# Patient Record
Sex: FEMALE | Race: WHITE | NOT HISPANIC OR LATINO | Employment: OTHER | URBAN - METROPOLITAN AREA
[De-identification: names, ages, dates, MRNs, and addresses within clinical notes are randomized per-mention and may not be internally consistent; named-entity substitution may affect disease eponyms.]

---

## 2017-02-06 ENCOUNTER — TELEPHONE (OUTPATIENT)
Dept: INTERNAL MEDICINE | Facility: CLINIC | Age: 67
End: 2017-02-06

## 2017-02-06 DIAGNOSIS — E11.9 TYPE 2 DIABETES MELLITUS WITHOUT COMPLICATION, WITHOUT LONG-TERM CURRENT USE OF INSULIN (H): Primary | ICD-10-CM

## 2017-02-06 DIAGNOSIS — E78.5 HYPERLIPIDEMIA LDL GOAL <100: ICD-10-CM

## 2017-02-06 DIAGNOSIS — I10 HYPERTENSION GOAL BP (BLOOD PRESSURE) < 140/90: ICD-10-CM

## 2017-02-06 RX ORDER — ATENOLOL 50 MG/1
50 TABLET ORAL DAILY
Qty: 90 TABLET | Refills: 3 | Status: SHIPPED
Start: 2017-02-06 | End: 2017-07-31

## 2017-02-06 RX ORDER — HYDROCHLOROTHIAZIDE 25 MG/1
25 TABLET ORAL EVERY MORNING
Qty: 90 TABLET | Refills: 3 | Status: SHIPPED
Start: 2017-02-06 | End: 2019-05-09

## 2017-02-06 RX ORDER — GLIPIZIDE 5 MG/1
5 TABLET, FILM COATED, EXTENDED RELEASE ORAL DAILY
Qty: 90 TABLET | Refills: 3 | Status: SHIPPED
Start: 2017-02-06 | End: 2019-03-15

## 2017-02-06 RX ORDER — SIMVASTATIN 80 MG
80 TABLET ORAL AT BEDTIME
Qty: 90 TABLET | Refills: 3 | Status: SHIPPED
Start: 2017-02-06 | End: 2019-11-13

## 2017-02-06 NOTE — TELEPHONE ENCOUNTER
Reason for Call:  Medication or medication refill:    Do you use a [facility]?  Name of the pharmacy and phone number for the current request:  [facility]    Name of the medication requested: all of her meds are due    Other request: Pt is set for Wellness exam on 02/14/17 with Dr Sharma - she will be out of her meds before that visit.  Can she get enough to hold her over until her appt?    Can we leave a detailed message on this number? YES    Phone number patient can be reached at: Home number on file [phone] (home)    Best Time: any      Call taken on 2/6/2017 at 7:48 AM by Abbie Mccarthy

## 2017-02-14 ENCOUNTER — OFFICE VISIT (OUTPATIENT)
Dept: INTERNAL MEDICINE | Facility: CLINIC | Age: 67
End: 2017-02-14
Payer: COMMERCIAL

## 2017-02-14 ENCOUNTER — HOSPITAL ENCOUNTER (OUTPATIENT)
Dept: MAMMOGRAPHY | Facility: CLINIC | Age: 67
Discharge: HOME | End: 2017-02-14
Attending: INTERNAL MEDICINE | Admitting: INTERNAL MEDICINE
Payer: MEDICARE

## 2017-02-14 VITALS
BODY MASS INDEX: 28.88 KG/M2 | HEART RATE: 88 BPM | DIASTOLIC BLOOD PRESSURE: 88 MMHG | TEMPERATURE: 97.3 F | WEIGHT: 163 LBS | RESPIRATION RATE: 16 BRPM | SYSTOLIC BLOOD PRESSURE: 126 MMHG | HEIGHT: 63 IN | OXYGEN SATURATION: 95 %

## 2017-02-14 DIAGNOSIS — I10 HYPERTENSION GOAL BP (BLOOD PRESSURE) < 140/90: ICD-10-CM

## 2017-02-14 DIAGNOSIS — E78.5 HYPERLIPIDEMIA LDL GOAL <100: ICD-10-CM

## 2017-02-14 DIAGNOSIS — E11.9 TYPE 2 DIABETES MELLITUS WITHOUT COMPLICATION, WITHOUT LONG-TERM CURRENT USE OF INSULIN (H): ICD-10-CM

## 2017-02-14 DIAGNOSIS — Z12.31 VISIT FOR SCREENING MAMMOGRAM: ICD-10-CM

## 2017-02-14 DIAGNOSIS — Z23 NEED FOR PNEUMOCOCCAL VACCINATION: ICD-10-CM

## 2017-02-14 DIAGNOSIS — Z00.00 ENCOUNTER FOR ROUTINE ADULT HEALTH EXAMINATION WITHOUT ABNORMAL FINDINGS: Primary | ICD-10-CM

## 2017-02-14 LAB
ALBUMIN SERPL-MCNC: 4 G/DL (ref 3.4–5)
ALP SERPL-CCNC: 72 U/L (ref 40–150)
ALT SERPL W P-5'-P-CCNC: 25 U/L (ref 0–50)
ANION GAP SERPL CALCULATED.3IONS-SCNC: 3 MMOL/L (ref 3–14)
AST SERPL W P-5'-P-CCNC: 17 U/L (ref 0–45)
BILIRUB SERPL-MCNC: 0.6 MG/DL (ref 0.2–1.3)
BUN SERPL-MCNC: 19 MG/DL (ref 7–30)
CALCIUM SERPL-MCNC: 9.5 MG/DL (ref 8.5–10.1)
CHLORIDE SERPL-SCNC: 102 MMOL/L (ref 94–109)
CHOLEST SERPL-MCNC: 164 MG/DL
CO2 SERPL-SCNC: 34 MMOL/L (ref 20–32)
CREAT SERPL-MCNC: 0.74 MG/DL (ref 0.52–1.04)
CREAT UR-MCNC: 154 MG/DL
GFR SERPL CREATININE-BSD FRML MDRD: 78 ML/MIN/1.7M2
GLUCOSE SERPL-MCNC: 223 MG/DL (ref 70–99)
HBA1C MFR BLD: 7.8 % (ref 4.3–6)
HDLC SERPL-MCNC: 65 MG/DL
LDLC SERPL CALC-MCNC: 74 MG/DL
MICROALBUMIN UR-MCNC: 10 MG/L
MICROALBUMIN/CREAT UR: 6.43 MG/G CR (ref 0–25)
NONHDLC SERPL-MCNC: 99 MG/DL
POTASSIUM SERPL-SCNC: 3.9 MMOL/L (ref 3.4–5.3)
PROT SERPL-MCNC: 7.3 G/DL (ref 6.8–8.8)
SODIUM SERPL-SCNC: 139 MMOL/L (ref 133–144)
TRIGL SERPL-MCNC: 124 MG/DL
TSH SERPL DL<=0.05 MIU/L-ACNC: 0.7 MU/L (ref 0.4–4)

## 2017-02-14 PROCEDURE — 84443 ASSAY THYROID STIM HORMONE: CPT | Performed by: INTERNAL MEDICINE

## 2017-02-14 PROCEDURE — 90670 PCV13 VACCINE IM: CPT | Performed by: INTERNAL MEDICINE

## 2017-02-14 PROCEDURE — 80061 LIPID PANEL: CPT | Performed by: INTERNAL MEDICINE

## 2017-02-14 PROCEDURE — 82043 UR ALBUMIN QUANTITATIVE: CPT | Performed by: INTERNAL MEDICINE

## 2017-02-14 PROCEDURE — G0009 ADMIN PNEUMOCOCCAL VACCINE: HCPCS | Performed by: INTERNAL MEDICINE

## 2017-02-14 PROCEDURE — 83036 HEMOGLOBIN GLYCOSYLATED A1C: CPT | Performed by: INTERNAL MEDICINE

## 2017-02-14 PROCEDURE — G0202 SCR MAMMO BI INCL CAD: HCPCS

## 2017-02-14 PROCEDURE — 80053 COMPREHEN METABOLIC PANEL: CPT | Performed by: INTERNAL MEDICINE

## 2017-02-14 PROCEDURE — G0438 PPPS, INITIAL VISIT: HCPCS | Performed by: INTERNAL MEDICINE

## 2017-02-14 PROCEDURE — 36415 COLL VENOUS BLD VENIPUNCTURE: CPT | Performed by: INTERNAL MEDICINE

## 2017-02-14 ASSESSMENT — PAIN SCALES - GENERAL: PAINLEVEL: NO PAIN (0)

## 2017-02-14 NOTE — NURSING NOTE
"Chief Complaint   Patient presents with     Wellness Visit       Initial /88 (BP Location: Left arm, Patient Position: Chair, Cuff Size: Adult Regular)  Pulse 88  Temp 97.3  F (36.3  C) (Temporal)  Resp 16  Ht 5' 3\" (1.6 m)  Wt 163 lb (73.9 kg)  SpO2 95%  BMI 28.87 kg/m2 Estimated body mass index is 28.87 kg/(m^2) as calculated from the following:    Height as of this encounter: 5' 3\" (1.6 m).    Weight as of this encounter: 163 lb (73.9 kg).  Medication Reconciliation: complete   Precious Moore MA    "

## 2017-02-14 NOTE — PATIENT INSTRUCTIONS
Preventive Health Recommendations    Female Ages 65 +    Yearly exam:     See your health care provider every year in order to  o Review health changes.   o Discuss preventive care.    o Review your medicines if your doctor has prescribed any.      You no longer need a yearly Pap test unless you've had an abnormal Pap test in the past 10 years. If you have vaginal symptoms, such as bleeding or discharge, be sure to talk with your provider about a Pap test.      Every 1 to 2 years, have a mammogram.  If you are over 69, talk with your health care provider about whether or not you want to continue having screening mammograms.      Every 10 years, have a colonoscopy. Or, have a yearly FIT test (stool test). These exams will check for colon cancer.       Have a cholesterol test every 5 years, or more often if your doctor advises it.       Have a diabetes test (fasting glucose) every three years. If you are at risk for diabetes, you should have this test more often.       At age 65, have a bone density scan (DEXA) to check for osteoporosis (brittle bone disease).    Shots:    Get a flu shot each year.    Get a tetanus shot every 10 years.    Talk to your doctor about your pneumonia vaccines. There are now two you should receive - Pneumovax (PPSV 23) and Prevnar (PCV 13).    Talk to your doctor about the shingles vaccine.    Talk to your doctor about the hepatitis B vaccine.    Nutrition:     Eat at least 5 servings of fruits and vegetables each day.      Eat whole-grain bread, whole-wheat pasta and brown rice instead of white grains and rice.      Talk to your provider about Calcium and Vitamin D.     Lifestyle    Exercise at least 150 minutes a week (30 minutes a day, 5 days a week). This will help you control your weight and prevent disease.      Limit alcohol to one drink per day.      No smoking.       Wear sunscreen to prevent skin cancer.       See your dentist twice a year for an exam and cleaning.      See your  eye doctor every 1 to 2 years to screen for conditions such as glaucoma, macular degeneration and cataracts.  Service Indications Recommend Completed   Vaccines  Pneumococcal   Once for patients >65     Influenza Yearly, for all beneficiaries     Hepatitis B (if medium/high risk) Medium/high risk factors:  ESRD  Hemophiliacs who received Factor XIII or IX concentrates  Clients of institutions for mentally handicapped  Persons who live in same house as a HepB carrier  Homosexual men  Illicit injectable drug users  Health care workers  Staff of institutions for mentally handicapped     Mammogram One-time screen between age 35-39, annually for age >39     Pap and Pelvic Exam Annually if  high risk,  or childbearing age with abnormal Pap in last 3 years.  Q24 months for all other women     Prostate Cancer Screening  Digital rectal exam  PSA Annually for all men > age 50     Colorectal Cancer Screening      Diabetes self-management training Requires referral by treating physician for patient with diabetes     Diabetes screening tests  Fasting blood sugar or glucose tolerance test Patient must be diagnosed with one of the following:  Hypertension  Dyslipidemia  Obesity (BMI ? 30 kg/m )  Previous impaired fasting glucose or impaired glucose tolerance             . . . or any two of the following:  Overweight (BMI ? 25, < 30)  Family history of diabetes  Age 65 years or older  History of gestational diabetes, or birth to baby weighing > 9 lbs.     Cardiovascular screening blood tests  Total cholesterol  HDL  Triglycerides All asymptomatic beneficiaries every 5 years     Medical nutrition therapy for diabetes or renal disease Requires referral by treating physician for patient with diabetes or renal disease     Glaucoma screening Patient must have one of the following risk factors:  Diabetes mellitus  Family history of glaucoma  -American age 50 and over  -American age 65 and over     Bone Density  Measurement  (Dexa scan) Patient must have one of the following risk factors:  Determined by provider to be estrogen deficient  Vertebral abnormalities on x-ray indicative of osteoporosis, osteopenia, or vertebral fracture  Receiving, or expected to receive, equivalent of 5 mg of Prednisone, or greater, per day, for > 3 months.  Known hyperparathyroidism  Patient being monitored for response to osteoporosis therapy     One-time AAA screen  ** Must be ordered as part of Medicare IPPE Patient must have one of the following:  Family history of AAA  Age 65-75, with history of smoking at least 100 cigarettes in lifetime

## 2017-02-14 NOTE — PROGRESS NOTES
Screening Questionnaire for Adult Immunization    Are you sick today?   No   Do you have allergies to medications, food, a vaccine component or latex?   No   Have you ever had a serious reaction after receiving a vaccination?   No   Do you have a long-term health problem with heart disease, lung disease, asthma, kidney disease, metabolic disease (e.g. diabetes), anemia, or other blood disorder?   Yes   Do you have cancer, leukemia, HIV/AIDS, or any other immune system problem?   No   In the past 3 months, have you taken medications that affect  your immune system, such as prednisone, other steroids, or anticancer drugs; drugs for the treatment of rheumatoid arthritis, Crohn s disease, or psoriasis; or have you had radiation treatments?   No   Have you had a seizure, or a brain or other nervous system problem?   No   During the past year, have you received a transfusion of blood or blood     products, or been given immune (gamma) globulin or antiviral drug?   No   For women: Are you pregnant or is there a chance you could become        pregnant during the next month?   No   Have you received any vaccinations in the past 4 weeks?   No     Immunization questionnaire was positive for at least one answer.  Notified yes.      MNVFC doesn't apply on this patient    Per orders of Dr. Rene Sharma, injection of prevnar given by Precious Moore. Patient instructed to remain in clinic for 20 minutes afterwards, and to report any adverse reaction to me immediately.       Screening performed by Precious Moore on 2/14/2017 at 9:01 AM.

## 2017-02-14 NOTE — PROGRESS NOTES
UBJECTIVE:                                                            Rylie Young is a 66 year old female who presents for Preventive Visit.  Are you in the first 12 months of your Medicare Part B coverage?  No    Healthy Habits:    Do you get at least three servings of calcium containing foods daily (dairy, green leafy vegetables, etc.)? yes    Amount of exercise or daily activities, outside of work: not much    Problems taking medications regularly No    Medication side effects: No    Have you had an eye exam in the past two years? yes    Do you see a dentist twice per year? yes    Do you have sleep apnea, excessive snoring or daytime drowsiness?no    COGNITIVE SCREEN  1) Repeat 3 items (Banana, Sunrise, Chair)    2) Clock draw: NORMAL  3) 3 item recall: Recalls 2 objects   Results: NORMAL clock, 1-2 items recalled: COGNITIVE IMPAIRMENT LESS LIKELY    Mini-CogTM Copyright S Tomas. Licensed by the author for use in [facility] [employer]; reprinted with permission ([email or web address]@[email or web address]). All rights reserved.      Doing pretty well, checking sugars twice a day, morning is higher and around 170 range.  Later is 150 before supper.  Sometimes lower.            All Histories reviewed and updated in [facility] as appropriate.  Social History   Substance Use Topics     Smoking status: Never Smoker     Smokeless tobacco: Not on file     Alcohol use No       The patient does not drink >3 drinks per day nor >7 drinks per week.    Today's PHQ-2 Score:   PHQ-2 ( 1999 Pfizer) 2/14/2017 2/5/2016   Q1: Little interest or pleasure in doing things 0 0   Q2: Feeling down, depressed or hopeless 0 0   PHQ-2 Score 0 0       Do you feel safe in your environment - Yes    Do you have a Health Care Directive?: Yes: Patient states has Advance Directive and will bring in a copy to clinic.    Current providers sharing in care for this patient include:   Patient Care Team:  Rene Sharma MD as PCP - General (Internal Medicine)      Hearing  impairment: No    Ability to successfully perform activities of daily living: Yes, no assistance needed     Fall risk:  Fallen 2 or more times in the past year?: No  Any fall with injury in the past year?: No    Home safety:  none identified      The following health maintenance items are reviewed in Epic and correct as of today:  Health Maintenance   Topic Date Due     HEPATITIS C SCREENING  [birth year]02/1968     DEXA SCAN SCREENING (SYSTEM ASSIGNED)  03/02/2013     PNEUMOCOCCAL (1 of 2 - PCV13) 03/02/2013     FOOT EXAM Q1 YEAR( NO INBASKET)  02/06/2014     EYE EXAM Q1 YEAR( NO INBASKET)  04/24/2015     A1C Q6 MO( NO INBASKET)  08/05/2016     TSH W/ FREE T4 REFLEX Q2 YEAR (NO INBASKET)  02/04/2017     CREATININE Q1 YEAR (NO INBASKET)  02/05/2017     LIPID MONITORING Q1 YEAR( NO INBASKET)  02/05/2017     MICROALBUMIN Q1 YEAR( NO INBASKET)  02/05/2017     ADVANCE DIRECTIVE PLANNING Q5 YRS (NO INBASKET)  02/07/2017     MAMMO SCREEN Q2 YR (SYSTEM ASSIGNED)  02/04/2017     FALL RISK ASSESSMENT  02/05/2017     TETANUS IMMUNIZATION (SYSTEM ASSIGNED)  02/17/2022     COLON CANCER SCREEN (SYSTEM ASSIGNED)  04/09/2024     INFLUENZA VACCINE (SYSTEM ASSIGNED)  Completed         Pneumonia Vaccine:prevnar 13 today.     ROS:  C: NEGATIVE for fever, chills, change in weight  I: NEGATIVE for worrisome rashes, moles or lesions  E: NEGATIVE for vision changes or irritation  E/M: NEGATIVE for ear, mouth and throat problems  R: NEGATIVE for significant cough or SOB  B: NEGATIVE for masses, tenderness or discharge  CV: NEGATIVE for chest pain, palpitations or peripheral edema  GI: NEGATIVE for nausea, abdominal pain, heartburn, or change in bowel habits  : NEGATIVE for frequency, dysuria, or hematuria  M: NEGATIVE for significant arthralgias or myalgia  N: NEGATIVE for weakness, dizziness or paresthesias  E: NEGATIVE for temperature intolerance, skin/hair changes  H: NEGATIVE for bleeding problems  P: NEGATIVE for changes in mood or  "affect    Problem list, Medication list, Allergies, and Medical/Social/Surgical histories reviewed in [facility] and updated as appropriate.  OBJECTIVE:                                                            /88 (BP Location: Left arm, Patient Position: Chair, Cuff Size: Adult Regular)  Pulse 88  Temp 97.3  F (36.3  C) (Temporal)  Resp 16  Ht 5' 3\" (1.6 m)  Wt 163 lb (73.9 kg)  SpO2 95%  BMI 28.87 kg/m2 Estimated body mass index is 28.87 kg/(m^2) as calculated from the following:    Height as of this encounter: 5' 3\" (1.6 m).    Weight as of this encounter: 163 lb (73.9 kg).  EXAM:   GENERAL: healthy, alert and no distress  EYES: Eyes grossly normal to inspection, PERRL and conjunctivae and sclerae normal  HENT: ear canals and TM's normal, nose and mouth without ulcers or lesions  NECK: no adenopathy, no asymmetry, masses, or scars and thyroid normal to palpation  RESP: lungs clear to auscultation - no rales, rhonchi or wheezes  BREAST: patient deferred exam  CV: regular rate and rhythm, normal S1 S2, no S3 or S4, no murmur, click or rub, no peripheral edema and peripheral pulses strong  ABDOMEN: soft, nontender, no hepatosplenomegaly, no masses and bowel sounds normal  MS: no gross musculoskeletal defects noted, no edema  SKIN: no suspicious lesions or rashes  NEURO: Normal strength and tone, mentation intact and speech normal  PSYCH: mentation appears normal, affect normal/bright    ASSESSMENT / PLAN:                                                                ICD-10-CM    1. Visit for screening mammogram Z12.31 *MA Screening Digital Bilateral   2. Encounter for routine adult health examination without abnormal findings Z00.00 Hemoglobin A1c     Lipid Profile     Comprehensive metabolic panel     Albumin Random Urine Quantitative     TSH   3. Type 2 diabetes mellitus without complication, without long-term current use of insulin (H) E11.9 Hemoglobin A1c     Lipid Profile     Comprehensive metabolic panel    " " Albumin Random Urine Quantitative     TSH   4. Hypertension goal BP (blood pressure) < 140/90 I10 Hemoglobin A1c     Lipid Profile     Comprehensive metabolic panel     Albumin Random Urine Quantitative     TSH   5. Hyperlipidemia LDL goal <100 E78.5 Hemoglobin A1c     Lipid Profile     Comprehensive metabolic panel     Albumin Random Urine Quantitative     TSH     Overall doing well, stable medical conditions, no changes, check labs today.    End of Life Planning:  Patient currently has an advanced directive: Yes.  Practitioner is supportive of decision.    COUNSELING:  Reviewed preventive health counseling, as reflected in patient instructions       Regular exercise       Healthy diet/nutrition        Estimated body mass index is 28.87 kg/(m^2) as calculated from the following:    Height as of this encounter: 5' 3\" (1.6 m).    Weight as of this encounter: 163 lb (73.9 kg).     reports that she has never smoked. She does not have any smokeless tobacco history on file.      Appropriate preventive services were discussed with this patient, including applicable screening as appropriate for cardiovascular disease, diabetes, osteopenia/osteoporosis, and glaucoma.  As appropriate for age/gender, discussed screening for colorectal cancer, prostate cancer, breast cancer, and cervical cancer. Checklist reviewing preventive services available has been given to the patient.    Reviewed patients plan of care and provided an AVS. The Basic Care Plan (routine screening as documented in Health Maintenance) for Rylie meets the Care Plan requirement. This Care Plan has been established and reviewed with the Patient.    Counseling Resources:  ATP IV Guidelines  Pooled Cohorts Equation Calculator  Breast Cancer Risk Calculator  FRAX Risk Assessment  ICSI Preventive Guidelines  Dietary Guidelines for Americans, 2010  USDA's MyPlate  ASA Prophylaxis  Lung CA Screening    Rene Sharma MD  [facility]  "

## 2017-02-14 NOTE — MR AVS SNAPSHOT
After Visit Summary   2/14/2017    Rylie Young    MRN: [id number]           Patient Information     Date Of Birth          1950        Visit Information        Provider Department      2/14/2017 7:45 AM Rene Sharma MD [facility] Instructions      Preventive Health Recommendations    Female Ages 65 +    Yearly exam:     See your health care provider every year in order to  o Review health changes.   o Discuss preventive care.    o Review your medicines if your doctor has prescribed any.      You no longer need a yearly Pap test unless you've had an abnormal Pap test in the past 10 years. If you have vaginal symptoms, such as bleeding or discharge, be sure to talk with your provider about a Pap test.      Every 1 to 2 years, have a mammogram.  If you are over 69, talk with your health care provider about whether or not you want to continue having screening mammograms.      Every 10 years, have a colonoscopy. Or, have a yearly FIT test (stool test). These exams will check for colon cancer.       Have a cholesterol test every 5 years, or more often if your doctor advises it.       Have a diabetes test (fasting glucose) every three years. If you are at risk for diabetes, you should have this test more often.       At age 65, have a bone density scan (DEXA) to check for osteoporosis (brittle bone disease).    Shots:    Get a flu shot each year.    Get a tetanus shot every 10 years.    Talk to your doctor about your pneumonia vaccines. There are now two you should receive - Pneumovax (PPSV 23) and Prevnar (PCV 13).    Talk to your doctor about the shingles vaccine.    Talk to your doctor about the hepatitis B vaccine.    Nutrition:     Eat at least 5 servings of fruits and vegetables each day.      Eat whole-grain bread, whole-wheat pasta and brown rice instead of white grains and rice.      Talk to your provider about Calcium and Vitamin D.     Lifestyle    Exercise  at least 150 minutes a week (30 minutes a day, 5 days a week). This will help you control your weight and prevent disease.      Limit alcohol to one drink per day.      No smoking.       Wear sunscreen to prevent skin cancer.       See your dentist twice a year for an exam and cleaning.      See your eye doctor every 1 to 2 years to screen for conditions such as glaucoma, macular degeneration and cataracts.  Service Indications Recommend Completed   Vaccines  Pneumococcal   Once for patients >65     Influenza Yearly, for all beneficiaries     Hepatitis B (if medium/high risk) Medium/high risk factors:  ESRD  Hemophiliacs who received Factor XIII or IX concentrates  Clients of institutions for mentally handicapped  Persons who live in same house as a HepB carrier  Homosexual men  Illicit injectable drug users  Health care workers  Staff of institutions for mentally handicapped     Mammogram One-time screen between age 35-39, annually for age >39     Pap and Pelvic Exam Annually if  high risk,  or childbearing age with abnormal Pap in last 3 years.  Q24 months for all other women     Prostate Cancer Screening  Digital rectal exam  PSA Annually for all men > age 50     Colorectal Cancer Screening      Diabetes self-management training Requires referral by treating physician for patient with diabetes     Diabetes screening tests  Fasting blood sugar or glucose tolerance test Patient must be diagnosed with one of the following:  Hypertension  Dyslipidemia  Obesity (BMI ? 30 kg/m )  Previous impaired fasting glucose or impaired glucose tolerance             . . . or any two of the following:  Overweight (BMI ? 25, < 30)  Family history of diabetes  Age 65 years or older  History of gestational diabetes, or birth to baby weighing > 9 lbs.     Cardiovascular screening blood tests  Total cholesterol  HDL  Triglycerides All asymptomatic beneficiaries every 5 years     Medical nutrition therapy for diabetes or renal disease  "Requires referral by treating physician for patient with diabetes or renal disease     Glaucoma screening Patient must have one of the following risk factors:  Diabetes mellitus  Family history of glaucoma  -American age 50 and over  -American age 65 and over     Bone Density Measurement  (Dexa scan) Patient must have one of the following risk factors:  Determined by provider to be estrogen deficient  Vertebral abnormalities on x-ray indicative of osteoporosis, osteopenia, or vertebral fracture  Receiving, or expected to receive, equivalent of 5 mg of Prednisone, or greater, per day, for > 3 months.  Known hyperparathyroidism  Patient being monitored for response to osteoporosis therapy     One-time AAA screen  ** Must be ordered as part of Medicare IPPE Patient must have one of the following:  Family history of AAA  Age 65-75, with history of smoking at least 100 cigarettes in lifetime                 Follow-ups after your visit        Who to contact     If you have questions or need follow up information about today's clinic visit or your schedule please contact [facility] directly at [phone].  Normal or non-critical lab and imaging results will be communicated to you by Art[employer], letter or phone within 4 business days after the clinic has received the results. If you do not hear from us within 7 days, please contact the clinic through [employer] or phone. If you have a critical or abnormal lab result, we will notify you by phone as soon as possible.  Submit refill requests through [employer] or call your pharmacy and they will forward the refill request to us. Please allow 3 business days for your refill to be completed.          Additional Information About Your Visit        [employer] Information     [employer] lets you send messages to your doctor, view your test results, renew your prescriptions, schedule appointments and more. To sign up, go to [email or web address] . Click on \"Log " "in\" on the left side of the screen, which will take you to the Welcome page. Then click on \"Sign up Now\" on the right side of the page.     You will be asked to enter the access code listed below, as well as some personal information. Please follow the directions to create your username and password.     Your access code is: [email or web address]  Expires: 5/15/2017  8:06 AM     Your access code will  in 90 days. If you need help or a new code, please call your [facility] clinic or [phone].        Care EveryWhere ID     This is your Care EveryWhere ID. This could be used by other organizations to access your [facility] medical records  [id number]        Your Vitals Were     Pulse Temperature Respirations Height Pulse Oximetry BMI (Body Mass Index)    88 97.3  F (36.3  C) (Temporal) 16 5' 3\" (1.6 m) 95% 28.87 kg/m2       Blood Pressure from Last 3 Encounters:   17 126/88   16 132/86   02/04/15 128/84    Weight from Last 3 Encounters:   17 163 lb (73.9 kg)   16 169 lb (76.7 kg)   02/04/15 164 lb (74.4 kg)              Today, you had the following     No orders found for display       Primary Care Provider Office Phone # Fax #    Rene Sharma [phone]       [facility] [address] [place] [address]        Thank you!     Thank you for choosing [facility]  for your care. Our goal is always to provide you with excellent care. Hearing back from our patients is one way we can continue to improve our services. Please take a few minutes to complete the written survey that you may receive in the mail after your visit with us. Thank you!             Your Updated Medication List - Protect others around you: Learn how to safely use, store and throw away your medicines at www.disposemymeds.org.          This list is accurate as of: 17  8:06 AM.  Always use your most recent med list.                   Brand Name Dispense Instructions for use    " ACCU-CHEK CHUCHO PLUS test strip   Generic drug:  blood glucose monitoring     100 each    USE AS DIRECTED TO TEST BLOOD SUGARS TWO TIMES A DAY OR AS DIRECTED       aspirin 81 MG tablet     30 tablet    Take  by mouth daily.       atenolol 50 MG tablet    TENORMIN    90 tablet    Take 1 tablet (50 mg) by mouth daily       blood glucose calibration solution     1    every 3 month       blood glucose monitoring lancets     2 Box    Use to test blood sugar two times daily or as directed.       blood glucose monitoring meter device kit     1 kit    Use to test blood sugars two times daily or as directed.       CALTRATE 600 + D 600-200 MG-IU Tabs     30    1 TABLET TWICE  DAILY       EXCEDRIN EXTRA STRENGTH 250-250-65 MG per tablet   Generic drug:  aspirin-acetaminophen-caffeine      Take 1 tablet by mouth as needed.       glipiZIDE 5 MG 24 hr tablet    glipiZIDE XL    90 tablet    Take 1 tablet (5 mg) by mouth daily       hydrochlorothiazide 25 MG tablet    HYDRODIURIL    90 tablet    Take 1 tablet (25 mg) by mouth every morning       metFORMIN 500 MG tablet    GLUCOPHAGE    360 tablet    Take 2 tablets (1,000 mg) by mouth 2 times daily (with meals)       multivitamin per tablet     100    ONE DAILY-pt reported       simvastatin 80 MG tablet    ZOCOR    90 tablet    Take 1 tablet (80 mg) by mouth At Bedtime

## 2017-02-21 ENCOUNTER — TELEPHONE (OUTPATIENT)
Dept: INTERNAL MEDICINE | Facility: CLINIC | Age: 67
End: 2017-02-21

## 2017-02-21 DIAGNOSIS — E11.9 TYPE 2 DIABETES MELLITUS WITHOUT COMPLICATION, WITHOUT LONG-TERM CURRENT USE OF INSULIN (H): Primary | ICD-10-CM

## 2017-02-21 NOTE — LETTER
[address]   [address]  Tel. [phone]/ Fax [phone]    October 9, 2017        Rylie Young  [address] [address] [facility] [address]          Dear Ms. Goldgia DIAS Hannah:    Your previous orders for lab work(HgbA1c) have been extended.  Please call to schedule a lab appointment and return to the clinic to have the labs drawn at your earliest convenience.    If you are required to be fasting for these tests,  remember to have nothing by mouth (except water) after midnight on the night before your test.    If you have any questions or concerns, please contact our office.    Sincerely,       Rene Sharma MD

## 2017-02-21 NOTE — TELEPHONE ENCOUNTER
Reason for Call:  Other     Detailed comments: Patient called to get her lab results. Results were given and she would like to have the results mailed to her home if that hasn't been done already.    Phone Number Patient can be reached at: Home number on file [phone] (home)    Best Time: any    Can we leave a detailed message on this number? YES    Call taken on 2/21/2017 at 4:14 PM by Crystal Beebe

## 2017-02-21 NOTE — LETTER
[address]   [address]  Tel. [phone]/ Fax [phone]    November 28, 2017        Rylie Young  [address] [address] [facility] [address]          Dear Ms. Goldgia DIAS Hannah:    Your previous orders for lab work(HgbA1c) have been extended.  Please call to schedule a lab appointment and return to the clinic to have the labs drawn at your earliest convenience.    If you are required to be fasting for these tests,  remember to have nothing by mouth (except water) after midnight on the night before your test.    If you have any questions or concerns, please contact our office.    Sincerely,       Rene Sharma MD

## 2017-02-24 DIAGNOSIS — E11.8 TYPE 2 DIABETES MELLITUS WITH COMPLICATION, UNSPECIFIED LONG TERM INSULIN USE STATUS: ICD-10-CM

## 2017-02-24 RX ORDER — BLOOD-GLUCOSE METER
EACH MISCELLANEOUS
Qty: 1 KIT | Refills: 0 | Status: SHIPPED | OUTPATIENT
Start: 2017-02-24

## 2017-02-24 NOTE — TELEPHONE ENCOUNTER
[facility] phone call message- patient requests medication or medication refill:    If this is a refill request, has the caller requested the refill from the pharmacy already? No  Name of the pharmacy and phone number for the current request:  [facility] [phone]    Name of the medication requested: new blood glucose monitor     Other request: Her current one gives her 2 very different readings on different fingers.     OK to leave the result message on voice mail or with a family member? YES    Call taken on 2/24/2017 at 8:12 AM by Saima Vásquez

## 2017-02-24 NOTE — TELEPHONE ENCOUNTER
Prescription approved per [facility] Refill Protocol.  Eunice Moore RN

## 2017-07-07 DIAGNOSIS — E11.9 TYPE 2 DIABETES MELLITUS WITHOUT COMPLICATION, WITHOUT LONG-TERM CURRENT USE OF INSULIN (H): Primary | ICD-10-CM

## 2017-07-07 RX ORDER — BLOOD SUGAR DIAGNOSTIC
STRIP MISCELLANEOUS
Qty: 100 EACH | Refills: 5 | Status: SHIPPED | OUTPATIENT
Start: 2017-07-07 | End: 2019-08-16

## 2017-07-31 ENCOUNTER — TELEPHONE (OUTPATIENT)
Dept: INTERNAL MEDICINE | Facility: CLINIC | Age: 67
End: 2017-07-31

## 2017-07-31 DIAGNOSIS — E78.5 HYPERLIPIDEMIA LDL GOAL <100: ICD-10-CM

## 2017-07-31 DIAGNOSIS — E11.9 TYPE 2 DIABETES MELLITUS WITHOUT COMPLICATION, WITHOUT LONG-TERM CURRENT USE OF INSULIN (H): ICD-10-CM

## 2017-07-31 DIAGNOSIS — I10 HYPERTENSION GOAL BP (BLOOD PRESSURE) < 140/90: ICD-10-CM

## 2017-07-31 RX ORDER — ATENOLOL 50 MG/1
50 TABLET ORAL DAILY
Qty: 90 TABLET | Refills: 3 | Status: SHIPPED | OUTPATIENT
Start: 2017-07-31 | End: 2019-11-13

## 2017-07-31 NOTE — TELEPHONE ENCOUNTER
Reason for Call:  Medication or medication refill:    Do you use a [facility]?  Name of the pharmacy and phone number for the current request:  [facility] - [phone]    Name of the medication requested: atenolol    Other request: pt stated atenolol is on backorder with [employer].    Can we leave a detailed message on this number? YES    Phone number patient can be reached at:     Best Time:     Call taken on 7/31/2017 at 9:18 AM by Lina Mendez

## 2019-02-13 ENCOUNTER — HOSPITAL ENCOUNTER (OUTPATIENT)
Dept: CT IMAGING | Facility: CLINIC | Age: 69
Discharge: HOME | End: 2019-02-13
Attending: INTERNAL MEDICINE | Admitting: INTERNAL MEDICINE
Payer: MEDICARE

## 2019-02-13 ENCOUNTER — TELEPHONE (OUTPATIENT)
Dept: INTERNAL MEDICINE | Facility: CLINIC | Age: 69
End: 2019-02-13

## 2019-02-13 ENCOUNTER — OFFICE VISIT (OUTPATIENT)
Dept: INTERNAL MEDICINE | Facility: CLINIC | Age: 69
End: 2019-02-13
Payer: MEDICARE

## 2019-02-13 VITALS
RESPIRATION RATE: 16 BRPM | BODY MASS INDEX: 28.17 KG/M2 | WEIGHT: 159 LBS | DIASTOLIC BLOOD PRESSURE: 84 MMHG | SYSTOLIC BLOOD PRESSURE: 148 MMHG | HEART RATE: 88 BPM | OXYGEN SATURATION: 97 % | HEIGHT: 63 IN | TEMPERATURE: 97.1 F

## 2019-02-13 DIAGNOSIS — R10.84 ABDOMINAL PAIN, GENERALIZED: ICD-10-CM

## 2019-02-13 DIAGNOSIS — R19.7 DIARRHEA, UNSPECIFIED TYPE: ICD-10-CM

## 2019-02-13 DIAGNOSIS — R19.7 DIARRHEA, UNSPECIFIED TYPE: Primary | ICD-10-CM

## 2019-02-13 DIAGNOSIS — K57.32 DIVERTICULITIS OF COLON: Primary | ICD-10-CM

## 2019-02-13 PROCEDURE — 74177 CT ABD & PELVIS W/CONTRAST: CPT

## 2019-02-13 PROCEDURE — 99214 OFFICE O/P EST MOD 30 MIN: CPT | Performed by: INTERNAL MEDICINE

## 2019-02-13 PROCEDURE — 25000128 H RX IP 250 OP 636: Performed by: RADIOLOGY

## 2019-02-13 PROCEDURE — 25000125 ZZHC RX 250: Performed by: RADIOLOGY

## 2019-02-13 RX ORDER — METRONIDAZOLE 500 MG/1
500 TABLET ORAL 3 TIMES DAILY
Qty: 30 TABLET | Refills: 0 | Status: SHIPPED | OUTPATIENT
Start: 2019-02-13 | End: 2019-03-15

## 2019-02-13 RX ORDER — CIPROFLOXACIN 500 MG/1
500 TABLET, FILM COATED ORAL 2 TIMES DAILY
Qty: 20 TABLET | Refills: 0 | Status: SHIPPED | OUTPATIENT
Start: 2019-02-13 | End: 2019-03-15

## 2019-02-13 RX ORDER — IOPAMIDOL 755 MG/ML
200 INJECTION, SOLUTION INTRAVASCULAR ONCE
Status: COMPLETED | OUTPATIENT
Start: 2019-02-13 | End: 2019-02-13

## 2019-02-13 RX ADMIN — SODIUM CHLORIDE 100 ML: 9 INJECTION, SOLUTION INTRAVENOUS at 11:27

## 2019-02-13 RX ADMIN — IOPAMIDOL 80 ML: 755 INJECTION, SOLUTION INTRAVENOUS at 11:27

## 2019-02-13 ASSESSMENT — PAIN SCALES - GENERAL: PAINLEVEL: NO PAIN (0)

## 2019-02-13 ASSESSMENT — MIFFLIN-ST. JEOR: SCORE: 1210.35

## 2019-02-13 NOTE — TELEPHONE ENCOUNTER
Pt informed of results, treatment and to follow up in one month.  Appt has been scheduled.  Rx's pended.

## 2019-02-13 NOTE — TELEPHONE ENCOUNTER
----- Message from Rene Sharma MD sent at 2/13/2019  4:03 PM CST -----  Her ct scan shows diverticulitis of the colon, a bacterial infection. She should take antibiotics of cipro 500mg bid for 10days, flagyl 500mg tid for 10 days-no alcohol use while on it.      See me back in 4 weeks, will need future colonoscopy.

## 2019-02-13 NOTE — PROGRESS NOTES
SUBJECTIVE:   Rylie Young is a 68 year old female who presents to clinic today for the following health issues:    Chief Complaint   Patient presents with     Diarrhea     diarrhea since 1/23/19, having abdominal pain and cramping as well     She had moved up to [facility] for a while, now back to [place].      Diarrhea started on January 23, only reason is that they doubled her glipizide, took for 4 days then went back to once a day.  Taking metformin 2 pills in AM and 2 pills before supper.  500mg immediate release.    Sugars have been higher now. 150-200 range.  a1c was 8.5     No antibiotics, no new supplements.  No sick contacts.      No bloody stools, cramping, some lower back pains.      Past Medical History:   Diagnosis Date     Diabetic eye exam (H) 04/24/2014    [facility]     Edema      Essential hypertension, benign      Mixed hyperlipidemia      Other forms of migraine, with intractable migraine, so stated, without mention of status migrainosus     resolved after menopause     Type II or unspecified type diabetes mellitus without mention of complication, not stated as uncontrolled      Current Outpatient Medications   Medication     aspirin 81 MG tablet     aspirin-acetaminophen-caffeine (EXCEDRIN EXTRA STRENGTH) 250-250-65 MG per tablet     atenolol (TENORMIN) 50 MG tablet     CALTRATE 600 + D 600-200 MG-IU OR TABS     glipiZIDE (GLIPIZIDE XL) 5 MG 24 hr tablet     hydrochlorothiazide (HYDRODIURIL) 25 MG tablet     metFORMIN (GLUCOPHAGE) 500 MG tablet     MULTIVITAMINS OR TABS     simvastatin (ZOCOR) 80 MG tablet     ACCU-CHEK CHUCHO PLUS test strip     ACCU-CHEK CHUCHO VI SOLN     blood glucose monitoring (ACCU-CHEK CHUCHO PLUS) meter device kit     blood glucose monitoring (SOFTCLIX) lancets     No current facility-administered medications for this visit.      Social History     Tobacco Use     Smoking status: Never Smoker     Smokeless tobacco: Never Used   Substance  "Use Topics     Alcohol use: No     Drug use: No     Review of Systems  Constitutional-No fevers, chills, or weight changes..  Cardiac-No chest pain or palpitations.  Respiratory-No cough, sob, or hemoptysis.  GI-Diarrhea.    Physical Exam  /84 (BP Location: Right arm, Patient Position: Sitting, Cuff Size: Adult Large)   Pulse 88   Temp 97.1  F (36.2  C) (Temporal)   Resp 16   Ht 1.6 m (5' 3\")   Wt 72.1 kg (159 lb)   SpO2 97%   BMI 28.17 kg/m    General Appearance-healthy, alert, no distress  Cardiac-regular rate and rhythm  with normal S1, S2 ; no murmur, rub or gallops  Lungs-clear to auscultation  GI-lower abdomen with tenderness to palpation    ASSESSMENT:  This is a 68-year-old woman who comes in after not being here for a couple years, she was living up in [place] and has moved back twice and he says and had a physical in [facility] but was not happy there.  Now she comes in with an acute concern of diarrhea.  She has had this for 3 weeks, denies any antibiotics.  Denies any change in her metformin.  The one change was that her glipizide went from 10 mg a day to 10 mg twice a day however she went back to just once a day dosing of the glipizide and she still has diarrhea, liquid stool.  No exposures.  She does have some mild tenderness on her lower abdomen I worry about some colitis, or diverticulitis it does seem to give her some low back pain.  We will workup with an abdominal CT today we will see him to get stool cultures for enteric pathogens and C. difficile.    Her diabetes will need better control last A1c was 8.5 and I see any, she can follow-up to see me with this and will also need her blood pressure treated she is having a lot of stress right now and hopefully that will improve and allow other conditions to improve.    Electronically signed by Rene Sharma MD          "

## 2019-02-14 DIAGNOSIS — R19.7 DIARRHEA, UNSPECIFIED TYPE: ICD-10-CM

## 2019-02-14 LAB
C COLI+JEJUNI+LARI FUSA STL QL NAA+PROBE: NOT DETECTED
C DIFF TOX B STL QL: NEGATIVE
EC STX1 GENE STL QL NAA+PROBE: NOT DETECTED
EC STX2 GENE STL QL NAA+PROBE: NOT DETECTED
ENTERIC PATHOGEN COMMENT: NORMAL
NOROV GI+II ORF1-ORF2 JNC STL QL NAA+PR: NOT DETECTED
RVA NSP5 STL QL NAA+PROBE: NOT DETECTED
SALMONELLA SP RPOD STL QL NAA+PROBE: NOT DETECTED
SHIGELLA SP+EIEC IPAH STL QL NAA+PROBE: NOT DETECTED
SPECIMEN SOURCE: NORMAL
V CHOL+PARA RFBL+TRKH+TNAA STL QL NAA+PR: NOT DETECTED
Y ENTERO RECN STL QL NAA+PROBE: NOT DETECTED

## 2019-02-14 PROCEDURE — 87506 IADNA-DNA/RNA PROBE TQ 6-11: CPT | Performed by: INTERNAL MEDICINE

## 2019-02-14 PROCEDURE — 87493 C DIFF AMPLIFIED PROBE: CPT | Mod: 59 | Performed by: INTERNAL MEDICINE

## 2019-03-15 ENCOUNTER — OFFICE VISIT (OUTPATIENT)
Dept: INTERNAL MEDICINE | Facility: CLINIC | Age: 69
End: 2019-03-15
Payer: MEDICARE

## 2019-03-15 VITALS
SYSTOLIC BLOOD PRESSURE: 154 MMHG | RESPIRATION RATE: 16 BRPM | HEART RATE: 72 BPM | BODY MASS INDEX: 28.17 KG/M2 | DIASTOLIC BLOOD PRESSURE: 84 MMHG | OXYGEN SATURATION: 97 % | WEIGHT: 159 LBS | TEMPERATURE: 97.5 F

## 2019-03-15 DIAGNOSIS — E78.5 HYPERLIPIDEMIA LDL GOAL <100: ICD-10-CM

## 2019-03-15 DIAGNOSIS — I10 HYPERTENSION GOAL BP (BLOOD PRESSURE) < 140/90: ICD-10-CM

## 2019-03-15 DIAGNOSIS — E11.9 TYPE 2 DIABETES MELLITUS WITHOUT COMPLICATION, WITHOUT LONG-TERM CURRENT USE OF INSULIN (H): ICD-10-CM

## 2019-03-15 DIAGNOSIS — K57.92 DIVERTICULITIS: Primary | ICD-10-CM

## 2019-03-15 PROCEDURE — 99214 OFFICE O/P EST MOD 30 MIN: CPT | Performed by: INTERNAL MEDICINE

## 2019-03-15 RX ORDER — GLIPIZIDE 5 MG/1
5 TABLET, FILM COATED, EXTENDED RELEASE ORAL 2 TIMES DAILY
Qty: 90 TABLET | Refills: 3 | COMMUNITY
Start: 2019-03-15 | End: 2019-11-13 | Stop reason: DRUGHIGH

## 2019-03-15 ASSESSMENT — PAIN SCALES - GENERAL: PAINLEVEL: NO PAIN (0)

## 2019-03-15 NOTE — PROGRESS NOTES
SUBJECTIVE:   Rylie Young is a 69 year old female who presents to clinic today for the following health issues:    Chief Complaint   Patient presents with     RECHECK     follow up on diverticulitis, finished antibiotics but still having issues     CT scan showed diverticulitis 4 weeks ago, took her medications.      She still has loose stools after eating.   If she watches out for heavy food, does better, getting back to normal. One heavy meal like a fish atkinson and goes through her quickly.  Weight is the same as 4 weeks.      Colonoscopy was done in 2014 , needs a new one with diverticulitis.     Sugars are still high, weight isn't going down.    Past Medical History:   Diagnosis Date     Diabetic eye exam (H) 04/24/2014    [facility]     Edema      Essential hypertension, benign      Mixed hyperlipidemia      Other forms of migraine, with intractable migraine, so stated, without mention of status migrainosus     resolved after menopause     Type II or unspecified type diabetes mellitus without mention of complication, not stated as uncontrolled      Current Outpatient Medications   Medication     aspirin 81 MG tablet     aspirin-acetaminophen-caffeine (EXCEDRIN EXTRA STRENGTH) 250-250-65 MG per tablet     atenolol (TENORMIN) 50 MG tablet     CALTRATE 600 + D 600-200 MG-IU OR TABS     glipiZIDE (GLIPIZIDE XL) 5 MG 24 hr tablet     hydrochlorothiazide (HYDRODIURIL) 25 MG tablet     metFORMIN (GLUCOPHAGE) 500 MG tablet     MULTIVITAMINS OR TABS     simvastatin (ZOCOR) 80 MG tablet     ACCU-CHEK CHUCHO PLUS test strip     ACCU-CHEK CHUCHO VI SOLN     blood glucose monitoring (ACCU-CHEK CHUCHO PLUS) meter device kit     blood glucose monitoring (SOFTCLIX) lancets     No current facility-administered medications for this visit.      Social History     Tobacco Use     Smoking status: Never Smoker     Smokeless tobacco: Never Used   Substance Use Topics     Alcohol use: No     Drug use: No      Review of Systems  Constitutional-No fevers, chills, or weight changes..  Cardiac-No chest pain or palpitations.  Respiratory-No cough, sob, or hemoptysis.  GI-Diarrhea.    Physical Exam  /84 (BP Location: Right arm, Cuff Size: Adult Regular)   Pulse 72   Temp 97.5  F (36.4  C) (Temporal)   Resp 16   Wt 72.1 kg (159 lb)   SpO2 97%   BMI 28.17 kg/m     General Appearance-healthy, alert, no distress  Cardiac-regular rate and rhythm  with normal S1, S2 ; no murmur, rub or gallops  Lungs-clear to auscultation  GI-Soft, nontender.  Normal bowel sounds.  No hepatosplenomegaly or abnormal masses  Extremities-no peripheral edema, peripheral pulses normal      ASSESSMENT:  This is a 69-year-old woman who has a history disease, hypertension hyperlipidemia.  She came to see me a month ago.  She was having abdominal pain, diarrhea.  The pain is in the left lower quadrant we did a CAT scan showing diverticulitis.  We discussed her diabetes meds and possibility interact with diarrhea as well.  With the diverticulitis she got Cipro and Flagyl and she is improved she has no pain on examination but she still has some loose stools.  I think this is related to her metformin we will cut the metformin back though 500 mg twice a day continue her glipizide at 5 mg twice a day.  If her sugars stay high she may need some other medication like Jardiance.    Her blood pressure is elevated at 154/84 she thinks is just when she is here and she gets white coat hypertension.  She will continue her medications work on exercise and activity and recheck her blood pressure on her own.  Will follow up with her for complete labs, blood pressure check and diabetes check in 3 months.      Electronically signed by Rene Sharma MD

## 2019-03-18 ENCOUNTER — TELEPHONE (OUTPATIENT)
Dept: INTERNAL MEDICINE | Facility: CLINIC | Age: 69
End: 2019-03-18

## 2019-03-18 NOTE — TELEPHONE ENCOUNTER
Date of colonoscopy/EGD: 4/3/19  Surgeon: Dr. Parsons  Prep:Miralax  Packet:Colonoscopy/EGD instructions mailed to patient's home address.   Date: 3/18/2019      Surgery Scheduler

## 2019-03-18 NOTE — LETTER

## 2019-03-18 NOTE — LETTER
[address] [address]  [phone]        March 18, 2019    Rylie Young  [address] [place] [address] [address] [place] [id number]

## 2019-03-30 ENCOUNTER — NURSE TRIAGE (OUTPATIENT)
Dept: NURSING | Facility: CLINIC | Age: 69
End: 2019-03-30

## 2019-03-30 NOTE — TELEPHONE ENCOUNTER
"\"I'm a diabetic and I'm scheduled to have a colonoscopy of Wednesday\".  Caller said her paper work said to call PCP to discuss diet and medications.    Caller is non insulin dependent Type 2 DM on two oral agents. Her blood sugars run between 165-273.    [facility] at [facility].  Dr. Verduzco is on call, page sent @ 10:16 am via smart web.    Dr. Verduzco advised to take medication through Tuesday.  If her blood sugars start to bottom out during prep work have clear liquid with sugar like 7 up hand to drink to brink sugars back up.    Don't take medications on Wednesday prior to procedure, resume medications after procedure.    No special diet changes other than the pre colonoscopy low fiber and clear liquid diet guidelines.    Called Rylie with provider's advice, she appears to understand directives and agrees with plan.    Marily Cannon RN  [facility]      "

## 2019-04-03 ENCOUNTER — ANESTHESIA EVENT (OUTPATIENT)
Dept: GASTROENTEROLOGY | Facility: CLINIC | Age: 69
End: 2019-04-03
Payer: MEDICARE

## 2019-04-03 ENCOUNTER — HOSPITAL ENCOUNTER (OUTPATIENT)
Facility: CLINIC | Age: 69
Discharge: HOME | End: 2019-04-03
Attending: SURGERY | Admitting: SURGERY
Payer: MEDICARE

## 2019-04-03 ENCOUNTER — ANESTHESIA (OUTPATIENT)
Dept: GASTROENTEROLOGY | Facility: CLINIC | Age: 69
End: 2019-04-03
Payer: MEDICARE

## 2019-04-03 VITALS
HEART RATE: 79 BPM | DIASTOLIC BLOOD PRESSURE: 98 MMHG | SYSTOLIC BLOOD PRESSURE: 162 MMHG | OXYGEN SATURATION: 97 % | RESPIRATION RATE: 16 BRPM | TEMPERATURE: 97.8 F

## 2019-04-03 LAB
COLONOSCOPY: NORMAL
GLUCOSE BLDC GLUCOMTR-MCNC: 195 MG/DL (ref 70–99)

## 2019-04-03 PROCEDURE — 37000008 ZZH ANESTHESIA TECHNICAL FEE, 1ST 30 MIN: Performed by: SURGERY

## 2019-04-03 PROCEDURE — 25000125 ZZHC RX 250: Performed by: NURSE ANESTHETIST, CERTIFIED REGISTERED

## 2019-04-03 PROCEDURE — 45378 DIAGNOSTIC COLONOSCOPY: CPT | Performed by: SURGERY

## 2019-04-03 PROCEDURE — 82962 GLUCOSE BLOOD TEST: CPT

## 2019-04-03 PROCEDURE — G0105 COLORECTAL SCRN; HI RISK IND: HCPCS | Performed by: SURGERY

## 2019-04-03 PROCEDURE — 37000009 ZZH ANESTHESIA TECHNICAL FEE, EACH ADDTL 15 MIN: Performed by: SURGERY

## 2019-04-03 PROCEDURE — 25000128 H RX IP 250 OP 636: Performed by: NURSE ANESTHETIST, CERTIFIED REGISTERED

## 2019-04-03 PROCEDURE — 25800030 ZZH RX IP 258 OP 636: Performed by: NURSE ANESTHETIST, CERTIFIED REGISTERED

## 2019-04-03 RX ORDER — NALOXONE HYDROCHLORIDE 0.4 MG/ML
.1-.4 INJECTION, SOLUTION INTRAMUSCULAR; INTRAVENOUS; SUBCUTANEOUS
Status: DISCONTINUED | OUTPATIENT
Start: 2019-04-03 | End: 2019-04-03 | Stop reason: HOSPADM

## 2019-04-03 RX ORDER — SODIUM CHLORIDE, SODIUM LACTATE, POTASSIUM CHLORIDE, CALCIUM CHLORIDE 600; 310; 30; 20 MG/100ML; MG/100ML; MG/100ML; MG/100ML
INJECTION, SOLUTION INTRAVENOUS CONTINUOUS
Status: DISCONTINUED | OUTPATIENT
Start: 2019-04-03 | End: 2019-04-03 | Stop reason: HOSPADM

## 2019-04-03 RX ORDER — LIDOCAINE HYDROCHLORIDE 20 MG/ML
INJECTION, SOLUTION INFILTRATION; PERINEURAL PRN
Status: DISCONTINUED | OUTPATIENT
Start: 2019-04-03 | End: 2019-04-03

## 2019-04-03 RX ORDER — ONDANSETRON 2 MG/ML
4 INJECTION INTRAMUSCULAR; INTRAVENOUS
Status: DISCONTINUED | OUTPATIENT
Start: 2019-04-03 | End: 2019-04-03 | Stop reason: HOSPADM

## 2019-04-03 RX ORDER — ONDANSETRON 4 MG/1
4 TABLET, ORALLY DISINTEGRATING ORAL EVERY 6 HOURS PRN
Status: DISCONTINUED | OUTPATIENT
Start: 2019-04-03 | End: 2019-04-03 | Stop reason: HOSPADM

## 2019-04-03 RX ORDER — FLUMAZENIL 0.1 MG/ML
0.2 INJECTION, SOLUTION INTRAVENOUS
Status: DISCONTINUED | OUTPATIENT
Start: 2019-04-03 | End: 2019-04-03 | Stop reason: HOSPADM

## 2019-04-03 RX ORDER — PROPOFOL 10 MG/ML
INJECTION, EMULSION INTRAVENOUS PRN
Status: DISCONTINUED | OUTPATIENT
Start: 2019-04-03 | End: 2019-04-03

## 2019-04-03 RX ORDER — LIDOCAINE 40 MG/G
CREAM TOPICAL
Status: DISCONTINUED | OUTPATIENT
Start: 2019-04-03 | End: 2019-04-03 | Stop reason: HOSPADM

## 2019-04-03 RX ORDER — PROPOFOL 10 MG/ML
INJECTION, EMULSION INTRAVENOUS CONTINUOUS PRN
Status: DISCONTINUED | OUTPATIENT
Start: 2019-04-03 | End: 2019-04-03

## 2019-04-03 RX ORDER — ONDANSETRON 2 MG/ML
4 INJECTION INTRAMUSCULAR; INTRAVENOUS EVERY 6 HOURS PRN
Status: DISCONTINUED | OUTPATIENT
Start: 2019-04-03 | End: 2019-04-03 | Stop reason: HOSPADM

## 2019-04-03 RX ADMIN — PROPOFOL 50 MG: 10 INJECTION, EMULSION INTRAVENOUS at 14:07

## 2019-04-03 RX ADMIN — PROPOFOL 20 MG: 10 INJECTION, EMULSION INTRAVENOUS at 14:09

## 2019-04-03 RX ADMIN — LIDOCAINE HYDROCHLORIDE 40 MG: 20 INJECTION, SOLUTION INFILTRATION; PERINEURAL at 14:07

## 2019-04-03 RX ADMIN — PROPOFOL 150 MCG/KG/MIN: 10 INJECTION, EMULSION INTRAVENOUS at 14:09

## 2019-04-03 RX ADMIN — SODIUM CHLORIDE, POTASSIUM CHLORIDE, SODIUM LACTATE AND CALCIUM CHLORIDE: 600; 310; 30; 20 INJECTION, SOLUTION INTRAVENOUS at 13:54

## 2019-04-03 NOTE — ANESTHESIA CARE TRANSFER NOTE
Patient: Rylie Young    Procedure(s):  COLONOSCOPY    Diagnosis: Diverticulitis  Diagnosis Additional Information: No value filed.    Anesthesia Type:   MAC     Note:  Airway :Room Air  Patient transferred to:Phase II  Handoff Report: Identifed the Patient, Identified the Reponsible Provider, Reviewed the pertinent medical history, Discussed the surgical course, Reviewed Intra-OP anesthesia mangement and issues during anesthesia, Set expectations for post-procedure period and Allowed opportunity for questions and acknowledgement of understanding      Vitals: (Last set prior to Anesthesia Care Transfer)    CRNA VITALS  4/3/2019 1400 - 4/3/2019 1500      4/3/2019             SpO2:  99 %                Electronically Signed By: WILLIE Crawford CRNA  April 3, 2019  3:14 PM

## 2019-04-03 NOTE — ANESTHESIA PREPROCEDURE EVALUATION
Anesthesia Pre-Procedure Evaluation    Patient: Rylie Young   MRN: [id number] : 1950          Preoperative Diagnosis: Diverticulitis    Procedure(s):  COLONOSCOPY    Past Medical History:   Diagnosis Date     Diabetic eye exam (H) 2014    [facility]     Edema      Essential hypertension, benign      Mixed hyperlipidemia      Other forms of migraine, with intractable migraine, so stated, without mention of status migrainosus     resolved after menopause     Type II or unspecified type diabetes mellitus without mention of complication, not stated as uncontrolled      Past Surgical History:   Procedure Laterality Date     COLONOSCOPY  10/08/2007    Internal hemorrhoids. Diverticulosis.     TUBAL LIGATION         Anesthesia Evaluation     . Pt has had prior anesthetic. Type: General and MAC    No history of anesthetic complications          ROS/MED HX    ENT/Pulmonary:  - neg pulmonary ROS     Neurologic:  - neg neurologic ROS     Cardiovascular:     (+) Dyslipidemia, hypertension----. : . . . :. . No previous cardiac testing       METS/Exercise Tolerance:  >4 METS   Hematologic:  - neg hematologic  ROS       Musculoskeletal:  - neg musculoskeletal ROS       GI/Hepatic:  - neg GI/hepatic ROS      (-) GERD   Renal/Genitourinary:  - ROS Renal section negative       Endo:     (+) type II DM Last HgA1c: 8.5 date: 29 Not using insulin - not using insulin pump Normal glucose range: 12:35- 195 not previously admitted for DM/DKA .      Psychiatric:  - neg psychiatric ROS       Infectious Disease:  - neg infectious disease ROS       Malignancy:      - no malignancy   Other:    - neg other ROS                      Physical Exam  Normal systems: cardiovascular and pulmonary    Airway   Mallampati: II  TM distance: >3 FB  Neck ROM: full    Dental   (+) partials    Cardiovascular   Rhythm and rate: regular and normal  (+) murmur       Pulmonary    breath sounds clear to  "auscultation    Other findings: Upper dentures at home        Lab Results   Component Value Date    WBC 6.5 04/09/2010    HGB 14.6 04/09/2010    HCT 42.5 04/09/2010     04/09/2010     02/14/2017    POTASSIUM 3.9 02/14/2017    CHLORIDE 102 02/14/2017    CO2 34 (H) 02/14/2017    BUN 19 02/14/2017    CR 0.74 02/14/2017     (H) 02/14/2017    KRANTHI 9.5 02/14/2017    ALBUMIN 4.0 02/14/2017    PROTTOTAL 7.3 02/14/2017    ALT 25 02/14/2017    AST 17 02/14/2017    ALKPHOS 72 02/14/2017    BILITOTAL 0.6 02/14/2017    TSH 0.70 02/14/2017       Preop Vitals  BP Readings from Last 3 Encounters:   04/03/19 159/80   03/15/19 154/84   02/13/19 148/84    Pulse Readings from Last 3 Encounters:   04/03/19 79   03/15/19 72   02/13/19 88      Resp Readings from Last 3 Encounters:   04/03/19 16   03/15/19 16   02/13/19 16    SpO2 Readings from Last 3 Encounters:   04/03/19 97%   03/15/19 97%   02/13/19 97%      Temp Readings from Last 1 Encounters:   04/03/19 97.8  F (36.6  C) (Oral)    Ht Readings from Last 1 Encounters:   02/13/19 1.6 m (5' 3\")      Wt Readings from Last 1 Encounters:   03/15/19 72.1 kg (159 lb)    Estimated body mass index is 28.17 kg/m  as calculated from the following:    Height as of 2/13/19: 1.6 m (5' 3\").    Weight as of 3/15/19: 72.1 kg (159 lb).       Anesthesia Plan      History & Physical Review  History and physical reviewed and following examination; no interval change.    ASA Status:  2 .    NPO Status:  > 8 hours    Plan for MAC with Intravenous and Propofol induction. Maintenance will be TIVA.  Reason for MAC:  Deep or markedly invasive procedure (G8)    Dr Parsons performed the H&P pre-op      Postoperative Care      Consents  Anesthetic plan, risks, benefits and alternatives discussed with:  Patient.  Use of blood products discussed: No .   .                 WILLIE Jacobsen CRNA  "

## 2019-04-03 NOTE — OR NURSING
Assumed patient care from Julia GIL RN after verbal report.  Patient is ready for discharge.  Dr. Parsons has just seen patient to review colonoscopy post procedure.

## 2019-04-03 NOTE — DISCHARGE INSTRUCTIONS
[facility]    Home Care Following Endoscopy          Activity:    You have just undergone an endoscopic procedure usually performed with conscious sedation.  Do not work or operate machinery (including a car) for at least 12 hours.      I encourage you to walk and attempt to pass this air as soon as possible.    Diet:    Return to the diet you were on before your procedure but eat lightly for the first 12-24 hours.    Drink plenty of water.    Resume any regular medications unless otherwise advised by your physician.  Please begin any new medication prescribed as a result of your procedure as directed by your physician.     If you had any biopsy or polyp removed please refrain from aspirin or aspirin products for 2 days.  If on Coumadin please restart as instructed by your physician.   Pain:    You may take Tylenol as needed for pain.  Expected Recovery:    You can expect some mild abdominal fullness and/or discomfort due to the air used to inflate your intestinal tract. It is also normal to have a mild sore throat after upper endoscopy.    Call Your Physician if You Have:    After Upper Endoscopy:  o Shoulder, back or chest pain.  o Difficulty breathing or swallowing.  o Vomiting blood.    After Colonoscopy:  o Worsening persisting abdominal pain which is worse with activity.  o Fevers (>101 degrees F), chills or shakes.  o Passage of continued blood with bowel movements.   Any questions or concerns about your recovery, please call [phone] or after hours [phone] Nurse Advice Line.    Follow-up Care:    You should receive a call or letter with your results within 1 week. Please call if you have not received a notification of your results.  If asked to return to clinic please make an appointment 1 week after your procedure.  Call [phone].

## 2019-04-03 NOTE — ANESTHESIA POSTPROCEDURE EVALUATION
Patient: Rylie Young    Procedure(s):  COLONOSCOPY    Diagnosis:Diverticulitis  Diagnosis Additional Information: No value filed.    Anesthesia Type:  MAC    Note:  Anesthesia Post Evaluation    Patient location during evaluation: Phase 2  Patient participation: Able to fully participate in evaluation  Level of consciousness: awake  Pain management: adequate  Airway patency: patent  Cardiovascular status: acceptable and hemodynamically stable  Respiratory status: acceptable, room air and nonlabored ventilation  Hydration status: stable  PONV: none     Anesthetic complications: None    Comments: Patient was happy with the anesthesia care received and no anesthesia related complications were noted.  I will follow up with the patient again if it is needed.        Last vitals:  Vitals:    04/03/19 1433 04/03/19 1444 04/03/19 1500   BP: 124/75 132/88 (!) 162/98   Pulse:      Resp: 16 16 16   Temp:      SpO2: 99% 97%          Electronically Signed By: WILLIE Crawford CRNA  April 3, 2019  3:18 PM

## 2019-05-07 DIAGNOSIS — E11.9 TYPE 2 DIABETES MELLITUS WITHOUT COMPLICATION, WITHOUT LONG-TERM CURRENT USE OF INSULIN (H): ICD-10-CM

## 2019-05-07 DIAGNOSIS — I10 HYPERTENSION GOAL BP (BLOOD PRESSURE) < 140/90: ICD-10-CM

## 2019-05-07 DIAGNOSIS — E78.5 HYPERLIPIDEMIA LDL GOAL <100: ICD-10-CM

## 2019-05-09 NOTE — TELEPHONE ENCOUNTER
"Requested Prescriptions   Pending Prescriptions Disp Refills     hydrochlorothiazide (HYDRODIURIL) 25 MG tablet 90 tablet 3     Sig: Take 1 tablet (25 mg) by mouth every morning   Last Written Prescription Date:  2/6/17  Last Fill Quantity: 90,  # refills: 3   Last office visit: 3/15/2019 with prescribing provider:     Future Office Visit:        Diuretics (Including Combos) Protocol Failed - 5/7/2019 11:07 AM        Failed - Blood pressure under 140/90 in past 12 months     BP Readings from Last 3 Encounters:   04/03/19 (!) 162/98   03/15/19 154/84   02/13/19 148/84           Failed - Normal serum creatinine on file in past 12 months     Recent Labs   Lab Test 02/14/17  0925   CR 0.74            Failed - Normal serum potassium on file in past 12 months     Recent Labs   Lab Test 02/14/17  0925   POTASSIUM 3.9            Failed - Normal serum sodium on file in past 12 months     Recent Labs   Lab Test 02/14/17  0925               Passed - Recent (12 mo) or future (30 days) visit within the authorizing provider's specialty     Patient had office visit in the last 12 months or has a visit in the next 30 days with authorizing provider or within the authorizing provider's specialty.  See \"Patient Info\" tab in inbasket, or \"Choose Columns\" in Meds & Orders section of the refill encounter.              Passed - Medication is active on med list        Passed - Patient is age 18 or older        Passed - No active pregancy on record        Passed - No positive pregnancy test in past 12 months      Routing refill request to provider for review/approval because:  Labs out of range:  B/P  Labs not current:  NA, Potassium, CR  A break in medication    T'd up 1 refill per mail order protocol for provider review.      Loida Barraza RN          "

## 2019-05-10 RX ORDER — HYDROCHLOROTHIAZIDE 25 MG/1
25 TABLET ORAL EVERY MORNING
Qty: 90 TABLET | Refills: 0 | Status: SHIPPED | OUTPATIENT
Start: 2019-05-10 | End: 2019-10-09

## 2019-06-17 ENCOUNTER — OFFICE VISIT (OUTPATIENT)
Dept: INTERNAL MEDICINE | Facility: CLINIC | Age: 69
End: 2019-06-17
Payer: MEDICARE

## 2019-06-17 VITALS
HEART RATE: 76 BPM | OXYGEN SATURATION: 95 % | TEMPERATURE: 97 F | BODY MASS INDEX: 28.87 KG/M2 | WEIGHT: 163 LBS | DIASTOLIC BLOOD PRESSURE: 94 MMHG | RESPIRATION RATE: 16 BRPM | SYSTOLIC BLOOD PRESSURE: 156 MMHG

## 2019-06-17 DIAGNOSIS — I10 HYPERTENSION GOAL BP (BLOOD PRESSURE) < 140/90: ICD-10-CM

## 2019-06-17 DIAGNOSIS — E78.5 HYPERLIPIDEMIA LDL GOAL <100: ICD-10-CM

## 2019-06-17 DIAGNOSIS — E11.9 TYPE 2 DIABETES MELLITUS WITHOUT COMPLICATION, WITHOUT LONG-TERM CURRENT USE OF INSULIN (H): Primary | ICD-10-CM

## 2019-06-17 DIAGNOSIS — F43.0 ACUTE REACTION TO STRESS: ICD-10-CM

## 2019-06-17 PROCEDURE — 99214 OFFICE O/P EST MOD 30 MIN: CPT | Performed by: INTERNAL MEDICINE

## 2019-06-17 PROCEDURE — 84443 ASSAY THYROID STIM HORMONE: CPT | Performed by: INTERNAL MEDICINE

## 2019-06-17 ASSESSMENT — PAIN SCALES - GENERAL: PAINLEVEL: NO PAIN (0)

## 2019-06-17 NOTE — PROGRESS NOTES
Subjective     Rylie Young is a 69 year old female who presents to clinic today for the following health issues:    HPI   Chief Complaint   Patient presents with     Diabetes     follow up     Diabetes is not controlled, sugars are high.  She doesn't feel that good.  Sugars are 170-200 range most mornings.  Worries they are higher later in the day.      Stress with her daughter and her health-previous stroke, personality is changing.  Daughter told her not to come see her anymore or to see her family.  Patient has not seen him for almost 6 months.  She moved from the area in [place] she was with her daughter back down to [place].  She has been holding all of this and is not talk to anybody.    Past Medical History:   Diagnosis Date     Diabetic eye exam (H) 04/24/2014    [facility]     Edema      Essential hypertension, benign      Mixed hyperlipidemia      Other forms of migraine, with intractable migraine, so stated, without mention of status migrainosus     resolved after menopause     Type II or unspecified type diabetes mellitus without mention of complication, not stated as uncontrolled      Current Outpatient Medications   Medication     ACCU-CHEK CHUCHO PLUS test strip     ACCU-CHEK CHUCHO VI SOLN     aspirin 81 MG tablet     aspirin-acetaminophen-caffeine (EXCEDRIN EXTRA STRENGTH) 250-250-65 MG per tablet     atenolol (TENORMIN) 50 MG tablet     blood glucose monitoring (ACCU-CHEK CHUCHO PLUS) meter device kit     blood glucose monitoring (SOFTCLIX) lancets     empagliflozin (JARDIANCE) 10 MG TABS tablet     glipiZIDE (GLIPIZIDE XL) 5 MG 24 hr tablet     hydrochlorothiazide (HYDRODIURIL) 25 MG tablet     metFORMIN (GLUCOPHAGE) 500 MG tablet     MULTIVITAMINS OR TABS     simvastatin (ZOCOR) 80 MG tablet     No current facility-administered medications for this visit.      Social History     Tobacco Use     Smoking status: Never Smoker     Smokeless tobacco: Never Used   Substance  Use Topics     Alcohol use: No     Drug use: No     Review of Systems  Constitutional-No fevers, chills, or weight changes..  ENT-No earpain, sore throat, voice changes or rhinitis.  Cardiac-No chest pain or palpitations.  Respiratory-No cough, sob, or hemoptysis.  GI-No nausea, vomitting, diarrhea, constipation, or blood in the stool.  Psych-stress worried, upset .    Physical Exam  BP (!) 156/94   Pulse 76   Temp 97  F (36.1  C) (Temporal)   Resp 16   Wt 73.9 kg (163 lb)   SpO2 95%   BMI 28.87 kg/m    General Appearance-healthy, alert, no distress  Cardiac-regular rate and rhythm  with normal S1, S2 ; no murmur, rub or gallops  Lungs-clear to auscultation  Psych-stressed, crying,     ASSESSMENT:  69-year-old woman that I know for the last few years, she has had type 2 diabetes which lately has not been controlled on metformin and glipizide.  Her sugars have been high in the 200 range probably higher when after she eats.  We will start her on Jardiance 10 milligrams a day and see how she does.  I like to see her back in 4 weeks.  With sugars written down.  We did discuss that she may need insulin if her pancreas is just worn out.  There is a concern about cost and hopefully her insurance will cover the Jardiance.    Hypertension the patient's blood pressure is high today at 156/94 she is extremely stressed to having some acute reaction of the stress and emotionally upset about her relationship with her daughter and that her daughter had a stroke and now they are not talking.  I did offer her counseling, medications, at this point we just did counseling in the office recommending she try to have some communication with her family either her daughter, her son, her son-in-law and try to get some resolution to this issue.    Follow-up in 4 weeks.      Electronically signed by Rene Sharma MD

## 2019-06-19 ENCOUNTER — TELEPHONE (OUTPATIENT)
Dept: INTERNAL MEDICINE | Facility: CLINIC | Age: 69
End: 2019-06-19

## 2019-06-19 DIAGNOSIS — I10 HYPERTENSION GOAL BP (BLOOD PRESSURE) < 140/90: ICD-10-CM

## 2019-06-19 DIAGNOSIS — E78.5 HYPERLIPIDEMIA LDL GOAL <100: ICD-10-CM

## 2019-06-19 DIAGNOSIS — E11.9 TYPE 2 DIABETES MELLITUS WITHOUT COMPLICATION, WITHOUT LONG-TERM CURRENT USE OF INSULIN (H): ICD-10-CM

## 2019-06-19 LAB
ALBUMIN SERPL-MCNC: 3.8 G/DL (ref 3.4–5)
ALP SERPL-CCNC: 69 U/L (ref 40–150)
ALT SERPL W P-5'-P-CCNC: 27 U/L (ref 0–50)
ANION GAP SERPL CALCULATED.3IONS-SCNC: 7 MMOL/L (ref 3–14)
AST SERPL W P-5'-P-CCNC: 19 U/L (ref 0–45)
BILIRUB SERPL-MCNC: 0.4 MG/DL (ref 0.2–1.3)
BUN SERPL-MCNC: 24 MG/DL (ref 7–30)
CALCIUM SERPL-MCNC: 9 MG/DL (ref 8.5–10.1)
CHLORIDE SERPL-SCNC: 100 MMOL/L (ref 94–109)
CO2 SERPL-SCNC: 30 MMOL/L (ref 20–32)
CREAT SERPL-MCNC: 0.92 MG/DL (ref 0.52–1.04)
CREAT UR-MCNC: 47 MG/DL
GFR SERPL CREATININE-BSD FRML MDRD: 62 ML/MIN/{1.73_M2}
GLUCOSE SERPL-MCNC: 175 MG/DL (ref 70–99)
HBA1C MFR BLD: 8.5 % (ref 0–5.6)
MICROALBUMIN UR-MCNC: <5 MG/L
MICROALBUMIN/CREAT UR: NORMAL MG/G CR (ref 0–25)
POTASSIUM SERPL-SCNC: 4 MMOL/L (ref 3.4–5.3)
PROT SERPL-MCNC: 7.3 G/DL (ref 6.8–8.8)
SODIUM SERPL-SCNC: 137 MMOL/L (ref 133–144)
TSH SERPL DL<=0.005 MIU/L-ACNC: 0.71 MU/L (ref 0.4–4)

## 2019-06-19 PROCEDURE — 82043 UR ALBUMIN QUANTITATIVE: CPT | Performed by: INTERNAL MEDICINE

## 2019-06-19 PROCEDURE — 84443 ASSAY THYROID STIM HORMONE: CPT | Performed by: INTERNAL MEDICINE

## 2019-06-19 PROCEDURE — 80053 COMPREHEN METABOLIC PANEL: CPT | Performed by: INTERNAL MEDICINE

## 2019-06-19 PROCEDURE — 36415 COLL VENOUS BLD VENIPUNCTURE: CPT | Performed by: INTERNAL MEDICINE

## 2019-06-19 PROCEDURE — 83036 HEMOGLOBIN GLYCOSYLATED A1C: CPT | Mod: QW | Performed by: INTERNAL MEDICINE

## 2019-06-19 NOTE — TELEPHONE ENCOUNTER
Patient was informed that farxiga or victoza are options but they may be expensive as well. Patient is going to call her insurance to see if these are expensive and then call us back to see if she wants Dr. Sharma to prescribe one for her.    Irais Pelaez, CMA

## 2019-06-19 NOTE — TELEPHONE ENCOUNTER
----- Message from Rene Sharma MD sent at 6/19/2019 11:57 AM CDT -----  Her labs are ok except her diabetes is too high with an hgba1c of 8.5. Continue metformin, jardiance and glipizide. Try to eat better, repeat a hgba1c in 3 months.

## 2019-06-19 NOTE — TELEPHONE ENCOUNTER
Patient was informed that her labs are ok except her diabetes is too high with an A1C of 8.5. Patient was advised to continue metformin, Jardiance and glipizide. Try to eat better, repeat A1C in 3 months.     Patient states that her insurance does not cover Jardiance and it will cost her $650 some dollars and she can not afford that every month. She stated that her insurance told her that they told her it would not cover it due to being in the tier II or tier III. Patient would like to know if there is a different medication you can prescribe or she will just take the metformin and glipizide.    Irais Pelaez, CMA

## 2019-06-21 ENCOUNTER — TELEPHONE (OUTPATIENT)
Dept: INTERNAL MEDICINE | Facility: CLINIC | Age: 69
End: 2019-06-21

## 2019-06-21 DIAGNOSIS — E11.9 TYPE 2 DIABETES MELLITUS WITHOUT COMPLICATION, WITHOUT LONG-TERM CURRENT USE OF INSULIN (H): Primary | ICD-10-CM

## 2019-06-21 NOTE — TELEPHONE ENCOUNTER
Reason for Call:  Medication or medication refill:    Do you use a [facility]?  Name of the pharmacy and phone number for the current request:  [facility]     Name of the medication requested: Jardiance     Other request: Pt can get this through mail order for $400 less than at [facility]. She would like 90 day supply with refills sent ASAP. Current Rx cannot be transferred. She is hoping a covering provider can do this today. Please call when done.     Can we leave a detailed message on this number? YES    Phone number patient can be reached at: Home number on file [phone] (home)    Best Time: any     Call taken on 6/21/2019 at 9:35 AM by Saima Vásquez

## 2019-06-25 NOTE — TELEPHONE ENCOUNTER
Patient calling and asking if she can get some samples of this medication (30 day supply) because it is still so expensive so she would like to try it out first. Please advise.

## 2019-06-25 NOTE — TELEPHONE ENCOUNTER
Left message that we have no sample medications in the clinic. She can check with the pharmacy to see if they might have a coupon or she can go to the drugs website to see if they have a discount card.

## 2019-07-15 ENCOUNTER — OFFICE VISIT (OUTPATIENT)
Dept: INTERNAL MEDICINE | Facility: CLINIC | Age: 69
End: 2019-07-15
Payer: MEDICARE

## 2019-07-15 VITALS
HEART RATE: 70 BPM | SYSTOLIC BLOOD PRESSURE: 110 MMHG | BODY MASS INDEX: 27.99 KG/M2 | TEMPERATURE: 97.2 F | DIASTOLIC BLOOD PRESSURE: 70 MMHG | WEIGHT: 158 LBS | OXYGEN SATURATION: 94 % | RESPIRATION RATE: 16 BRPM

## 2019-07-15 DIAGNOSIS — R20.0 ARM NUMBNESS LEFT: Primary | ICD-10-CM

## 2019-07-15 DIAGNOSIS — E11.9 TYPE 2 DIABETES MELLITUS WITHOUT COMPLICATION, WITHOUT LONG-TERM CURRENT USE OF INSULIN (H): ICD-10-CM

## 2019-07-15 PROCEDURE — 99213 OFFICE O/P EST LOW 20 MIN: CPT | Performed by: INTERNAL MEDICINE

## 2019-07-15 ASSESSMENT — PAIN SCALES - GENERAL: PAINLEVEL: NO PAIN (0)

## 2019-07-15 NOTE — PROGRESS NOTES
Subjective     Rylie Young is a 69 year old female who presents to clinic today for the following health issues:    HPI   Chief Complaint   Patient presents with     Diabetes     follow up     Numbness     arms and hands     BP is good, weight is down 5 pounds.    Taking Jardiance since June 29th.  Script was very expensive.  She got it down to $260 for 3 months.      Sugars are better getting some 150 range.      Left arm with tiny needle sensation. Worse the last few weeks, worse at night depends on position.      Past Medical History:   Diagnosis Date     Diabetic eye exam (H) 04/24/2014    [facility]     Edema      Essential hypertension, benign      Mixed hyperlipidemia      Other forms of migraine, with intractable migraine, so stated, without mention of status migrainosus     resolved after menopause     Type II or unspecified type diabetes mellitus without mention of complication, not stated as uncontrolled      Current Outpatient Medications   Medication     ACCU-CHEK CHUCHO PLUS test strip     ACCU-CHEK CHUCHO VI SOLN     aspirin 81 MG tablet     aspirin-acetaminophen-caffeine (EXCEDRIN EXTRA STRENGTH) 250-250-65 MG per tablet     atenolol (TENORMIN) 50 MG tablet     blood glucose monitoring (ACCU-CHEK CHUCHO PLUS) meter device kit     blood glucose monitoring (SOFTCLIX) lancets     empagliflozin (JARDIANCE) 10 MG TABS tablet     glipiZIDE (GLIPIZIDE XL) 5 MG 24 hr tablet     hydrochlorothiazide (HYDRODIURIL) 25 MG tablet     metFORMIN (GLUCOPHAGE) 500 MG tablet     MULTIVITAMINS OR TABS     simvastatin (ZOCOR) 80 MG tablet     No current facility-administered medications for this visit.      Social History     Tobacco Use     Smoking status: Never Smoker     Smokeless tobacco: Never Used   Substance Use Topics     Alcohol use: No     Drug use: No       Physical Exam  /70   Pulse 70   Temp 97.2  F (36.2  C) (Temporal)   Resp 16   Wt 71.7 kg (158 lb)   SpO2 94%   BMI  27.99 kg/m    General Appearance-healthy, alert, no distress  Hands with no atrophy, sensation intact today, strength is good.     ASSESSMENT:  Patient here with type 2 diabetes.  She has been on Jardiance since the end of June, took her a long time to find an affordable pharmacy.  But with mail-order she can get 90 days for $260.  She is willing to try that.  Her sugars have been down in the 150 range.  We will wait to get her A1c in September.    Arms with numbness worse on the left, we discussed possible carpal tunnel.  She can watch for the different sensation patterns.  I recommended an EMG.  But she would like to wait due to cost.    Electronically signed by Rene Sharma MD

## 2019-08-16 DIAGNOSIS — E11.9 TYPE 2 DIABETES MELLITUS WITHOUT COMPLICATION, WITHOUT LONG-TERM CURRENT USE OF INSULIN (H): ICD-10-CM

## 2019-08-16 NOTE — TELEPHONE ENCOUNTER
"  Requested Prescriptions   Pending Prescriptions Disp Refills     blood glucose (ACCU-CHEK CHUCHO PLUS) test strip 100 each 5     Sig: Use to test blood sugar 2 times daily or as directed.   Last Written Prescription Date:  7/7/2017  Last Fill Quantity: 100,  # refills: 5   Last office visit: 7/15/2019 with prescribing provider:     Future Office Visit:        Diabetic Supplies Protocol Passed - 8/16/2019  3:20 PM        Passed - Medication is active on med list        Passed - Patient is 18 years of age or older        Passed - Recent (6 mo) or future (30 days) visit within the authorizing provider's specialty     Patient had office visit in the last 6 months or has a visit in the next 30 days with authorizing provider.  See \"Patient Info\" tab in inbasket, or \"Choose Columns\" in Meds & Orders section of the refill encounter.          Prescription approved per [facility] Refill Protocol.  Loida Barraza RN      "

## 2019-10-09 ENCOUNTER — TELEPHONE (OUTPATIENT)
Dept: INTERNAL MEDICINE | Facility: CLINIC | Age: 69
End: 2019-10-09

## 2019-10-09 ENCOUNTER — OFFICE VISIT (OUTPATIENT)
Dept: INTERNAL MEDICINE | Facility: CLINIC | Age: 69
End: 2019-10-09
Payer: MEDICARE

## 2019-10-09 VITALS
RESPIRATION RATE: 16 BRPM | TEMPERATURE: 97.2 F | SYSTOLIC BLOOD PRESSURE: 132 MMHG | HEART RATE: 68 BPM | BODY MASS INDEX: 27.65 KG/M2 | DIASTOLIC BLOOD PRESSURE: 78 MMHG | WEIGHT: 156.1 LBS | OXYGEN SATURATION: 95 %

## 2019-10-09 DIAGNOSIS — Z23 NEED FOR PROPHYLACTIC VACCINATION AND INOCULATION AGAINST INFLUENZA: Primary | ICD-10-CM

## 2019-10-09 DIAGNOSIS — E78.5 HYPERLIPIDEMIA LDL GOAL <100: ICD-10-CM

## 2019-10-09 DIAGNOSIS — E11.9 TYPE 2 DIABETES MELLITUS WITHOUT COMPLICATION, WITHOUT LONG-TERM CURRENT USE OF INSULIN (H): ICD-10-CM

## 2019-10-09 DIAGNOSIS — I10 HYPERTENSION GOAL BP (BLOOD PRESSURE) < 140/90: ICD-10-CM

## 2019-10-09 LAB — HBA1C MFR BLD: 7 % (ref 0–5.6)

## 2019-10-09 PROCEDURE — 90662 IIV NO PRSV INCREASED AG IM: CPT | Performed by: INTERNAL MEDICINE

## 2019-10-09 PROCEDURE — 36415 COLL VENOUS BLD VENIPUNCTURE: CPT | Performed by: INTERNAL MEDICINE

## 2019-10-09 PROCEDURE — G0008 ADMIN INFLUENZA VIRUS VAC: HCPCS | Performed by: INTERNAL MEDICINE

## 2019-10-09 PROCEDURE — 99213 OFFICE O/P EST LOW 20 MIN: CPT | Performed by: INTERNAL MEDICINE

## 2019-10-09 PROCEDURE — 83036 HEMOGLOBIN GLYCOSYLATED A1C: CPT | Performed by: INTERNAL MEDICINE

## 2019-10-09 RX ORDER — HYDROCHLOROTHIAZIDE 25 MG/1
25 TABLET ORAL EVERY MORNING
Qty: 90 TABLET | Refills: 3 | Status: SHIPPED | OUTPATIENT
Start: 2019-10-09 | End: 2019-11-13

## 2019-10-09 ASSESSMENT — PAIN SCALES - GENERAL: PAINLEVEL: NO PAIN (0)

## 2019-10-09 NOTE — TELEPHONE ENCOUNTER
----- Message from Rene Sharma MD sent at 10/9/2019 12:24 PM CDT -----  Her diabetes is much better, congratulations A1c is 7 continue her medications.

## 2019-10-09 NOTE — TELEPHONE ENCOUNTER
Patient was informed that her diabetes is much better, congratulations A1C is 7. Continue her medications.    Irais Pelaez, CMA

## 2019-10-09 NOTE — PROGRESS NOTES
Subjective     Rylie Young is a 69 year old female who presents to clinic today for the following health issues:    HPI   Diabetes Follow-up      How often are you checking your blood sugar? 1-3 daily    What time of day are you checking your blood sugars (select all that apply)?  Before meals    Have you had any blood sugars above 200?  No    Have you had any blood sugars below 70?  No    What symptoms do you notice when your blood sugar is low?  Shaky, Dizzy and Weak    What concerns do you have today about your diabetes? None     Do you have any of these symptoms? (Select all that apply)  Dry mouth     Have you had a diabetic eye exam in the last 12 months? No    BP Readings from Last 2 Encounters:   10/09/19 132/78   07/15/19 110/70     Hemoglobin A1C (%)   Date Value   06/19/2019 8.5 (H)   02/14/2017 7.8 (H)     LDL Cholesterol Calculated (mg/dL)   Date Value   02/14/2017 74   02/05/2016 55       Diabetes Management Resources      How many servings of fruits and vegetables do you eat daily?  4 or more    On average, how many sweetened beverages do you drink each day (soda, juice, sweet tea, etc)?   0    How many days per week do you miss taking your medication? 0    Diabetes is doing ok, checking sugars in the mornings, higher after eating.  Not going to low.  Goes up with meals and carbs.  Weight is down 2 pounds.  Down 7 pounds since June.      Taking Jardiance, glipizide, and metformin.  Been on jardiance and working well, able to afford.  Will have to see how insurance covers it.     Having some diarrhea and diverticulitis, coffee affecting.     Past Medical History:   Diagnosis Date     Diabetic eye exam (H) 04/24/2014    [facility]     Edema      Essential hypertension, benign      Mixed hyperlipidemia      Other forms of migraine, with intractable migraine, so stated, without mention of status migrainosus     resolved after menopause     Type II or unspecified type diabetes  mellitus without mention of complication, not stated as uncontrolled      Current Outpatient Medications   Medication     ACCU-CHEK CHUCHO VI SOLN     aspirin 81 MG tablet     aspirin-acetaminophen-caffeine (EXCEDRIN EXTRA STRENGTH) 250-250-65 MG per tablet     atenolol (TENORMIN) 50 MG tablet     blood glucose (ACCU-CHEK CHUCHO PLUS) test strip     blood glucose monitoring (ACCU-CHEK CHUCHO PLUS) meter device kit     blood glucose monitoring (SOFTCLIX) lancets     empagliflozin (JARDIANCE) 10 MG TABS tablet     glipiZIDE (GLIPIZIDE XL) 5 MG 24 hr tablet     hydrochlorothiazide (HYDRODIURIL) 25 MG tablet     metFORMIN (GLUCOPHAGE) 500 MG tablet     MULTIVITAMINS OR TABS     simvastatin (ZOCOR) 80 MG tablet     No current facility-administered medications for this visit.      Review of Systems  Constitutional-No fevers, chills, or weight changes..  ENT-No earpain, sore throat, voice changes or rhinitis.  Cardiac-No chest pain or palpitations.  Respiratory-No cough, sob, or hemoptysis.  GI-No nausea, vomitting, diarrhea, constipation, or blood in the stool.    Physical Exam  /78 (BP Location: Left arm, Patient Position: Sitting, Cuff Size: Adult Regular)   Pulse 68   Temp 97.2  F (36.2  C) (Temporal)   Resp 16   Wt 70.8 kg (156 lb 1.6 oz)   SpO2 95%   Breastfeeding? No   BMI 27.65 kg/m    General Appearance-healthy, alert, no distress  Cardiac-regular rate and rhythm  with normal S1, S2 ; no murmur, rub or gallops  Lungs-clear to auscultation  Extremities-no peripheral edema, peripheral pulses normal    ASSESSMENT:  Patient here for diabetic recheck.  I saw her in the summer.  Her A1c was up to 8.  We got her on Jardiance.  She continues with glipizide, metformin and Jardiance.  Her sugars are checked every morning and they are in the 1 40-1 50 range.  We will recheck her A1c today and sure it is improved.  She has continued to lose weight down 7 pounds since June.  She has no side effects from the Jardiance  we will continue to use these medications as long as she can afford her mother and covered by her insurance.  Patient was given a flu shot today.      Electronically signed by Rene Sharma MD

## 2019-10-28 ENCOUNTER — TELEPHONE (OUTPATIENT)
Dept: SCHEDULING | Facility: CLINIC | Age: 69
End: 2019-10-28

## 2019-10-30 ENCOUNTER — TELEPHONE (OUTPATIENT)
Dept: INTERNAL MEDICINE | Facility: CLINIC | Age: 69
End: 2019-10-30

## 2019-10-30 NOTE — TELEPHONE ENCOUNTER
Overall the Jardiance is a much better medication for diabetes the pioglitazone.  Please see why she wants to switch.  If it is cost we may try Farxiga or see if there are ways to get it covered with [facility] pharmacy.

## 2019-10-30 NOTE — TELEPHONE ENCOUNTER
Patient is liking and doing well on the Jardiance but does not like the cost.  She talked with someone at [facility] and they said everything else like that one is costly except for the pioglitazone.  Please advise.

## 2019-10-30 NOTE — TELEPHONE ENCOUNTER
Reason for Call:  Other call back    Detailed comments: Patient has a question about switching her (JARDIANCE) 10 MG TABS tablet to Pioglitazone... Please call her to discuss.     Phone Number Patient can be reached at: Cell number on file:    Telephone Information:   Mobile [phone]       Best Time: any     Can we leave a detailed message on this number? YES    Call taken on 10/30/2019 at 8:05 AM by Crystal Lr

## 2019-11-13 ENCOUNTER — OFFICE VISIT (OUTPATIENT)
Dept: PHARMACY | Facility: CLINIC | Age: 69
End: 2019-11-13
Payer: COMMERCIAL

## 2019-11-13 VITALS — DIASTOLIC BLOOD PRESSURE: 80 MMHG | SYSTOLIC BLOOD PRESSURE: 132 MMHG

## 2019-11-13 DIAGNOSIS — I10 HYPERTENSION GOAL BP (BLOOD PRESSURE) < 140/90: ICD-10-CM

## 2019-11-13 DIAGNOSIS — E78.5 HYPERLIPIDEMIA LDL GOAL <100: ICD-10-CM

## 2019-11-13 DIAGNOSIS — E11.9 TYPE 2 DIABETES MELLITUS WITHOUT COMPLICATION, WITHOUT LONG-TERM CURRENT USE OF INSULIN (H): Primary | ICD-10-CM

## 2019-11-13 PROCEDURE — 99607 MTMS BY PHARM ADDL 15 MIN: CPT | Performed by: PHARMACIST

## 2019-11-13 PROCEDURE — 99605 MTMS BY PHARM NP 15 MIN: CPT | Performed by: PHARMACIST

## 2019-11-13 RX ORDER — HYDROCHLOROTHIAZIDE 25 MG/1
25 TABLET ORAL EVERY MORNING
Qty: 90 TABLET | Refills: 1 | Status: SHIPPED | OUTPATIENT
Start: 2019-11-13 | End: 2020-06-10

## 2019-11-13 RX ORDER — GLIPIZIDE 10 MG/1
10 TABLET, FILM COATED, EXTENDED RELEASE ORAL 2 TIMES DAILY
COMMUNITY
Start: 2018-05-14 | End: 2019-11-13

## 2019-11-13 RX ORDER — SIMVASTATIN 40 MG
40 TABLET ORAL DAILY
Qty: 90 TABLET | Refills: 1 | Status: SHIPPED | OUTPATIENT
Start: 2019-11-13 | End: 2020-06-10

## 2019-11-13 RX ORDER — GLIPIZIDE 10 MG/1
10 TABLET, FILM COATED, EXTENDED RELEASE ORAL 2 TIMES DAILY
Qty: 180 TABLET | Refills: 1 | Status: SHIPPED | OUTPATIENT
Start: 2019-11-13 | End: 2020-06-10

## 2019-11-13 RX ORDER — SIMVASTATIN 80 MG
0.5 TABLET ORAL DAILY
COMMUNITY
Start: 2018-05-14 | End: 2019-11-13

## 2019-11-13 RX ORDER — ATENOLOL 50 MG/1
50 TABLET ORAL DAILY
Qty: 90 TABLET | Refills: 3 | Status: SHIPPED | OUTPATIENT
Start: 2019-11-13 | End: 2020-12-15

## 2019-11-13 NOTE — PROGRESS NOTES
"SUBJECTIVE/OBJECTIVE:                           Rylie Young is a 69 year old female coming in for an initial visit for Medication Therapy Management.  She was referred to me from Dr. Verduzco.    Chief Complaint: Medication costs/Diabetes alternatives.  Personal Healthcare Goals: keep sugars controlled    Allergies/ADRs: Reviewed in Epic  Tobacco:  reports that she has never smoked. She has never used smokeless tobacco.  Alcohol: none  Activity: pt lives in a condo - some activity  PMH: Reviewed in Epic    Medication Adherence/Access:  Patient takes medications directly from bottles.  Patient takes medications 3 time(s) per day. Takes Jardiance around 2-3 PM - did not know if she could take multiple medications together   Per patient, misses medication 0 times per week.   Medication barriers: affording medications - she has been able to managed taking Jardiance this year, but is unsure about her upcoming insurance change.  Pt has bad ~$400+ out of pocket per refill.  The patient fills medications at [facility]: NO, fills medications at [facility] - will be changing to [facility] in [place] (lives in [place]).    Diabetes:  Pt currently taking metformin  mg - TWO tablets twice daily, glipizide XL 10 mg twice daily (this dose was only 5 mg BID in patient's chart, but she has been on this dose from outside clinic), and Jardiance 10 mg daily.  Pt states that she is taking Jardiance in the middle of the day to avoid taking \"too many medications at once\" and she thought it would help with her sugars at different times of days. Pt is not experiencing side effects. She has had loose stools, but associates this with coffee and diverticulitis. Due to costs she is concerned about continuing Jardiance - she has not had a good experience with coverage through [facility].  She has not pursued patient assistance programs. She looked on her formulary and wondered about trying pioglitazone. She was unsure about why she " "was referred to MTM. She is concerned about weight gain.   SMBG: one time daily.   Ranges (patient reported): average = 150 mg/dL (majority less, but 1-3 sugars a week are higher due to what she eats)  Patient is not experiencing hypoglycemia  Recent symptoms of high blood sugar? none  Eye exam: due  Foot exam: due  ACEi/ARB: No.   Urine Albumin:   Lab Results   Component Value Date    UMALCR Unable to calculate due to low value 06/19/2019      Aspirin: Taking 81mg daily and denies side effects  Diet/Exercise: Pt states that she tries to watch her foods closely. Tends to eat more \"sugar-free\" options if available. Admits that last night she had pancakes with \"sugar-free\" syrup at a restaurant.     Hypertension: Current medications include hydrochlorothiazide 25 mg daily and atenlol 50 mg daily.  Patient does not self-monitor BP.  Patient reports no current medication side effects.    Hyperlipidemia: Current therapy includes simvastatin 40 mg once daily (pt's prescription has been to take half a 80 mg tablet - chart said 80 mg full tablet).  Pt reports no significant myalgias or other side effects.  The 10-year ASCVD risk score (Sedalia ELSI Jr., et al., 2013) is: 24.9%    Values used to calculate the score:      Age: 69 years      Sex: Female      Is Non- : No      Diabetic: Yes      Tobacco smoker: No      Systolic Blood Pressure: 132 mmHg      Is BP treated: Yes      HDL Cholesterol: 65 mg/dL      Total Cholesterol: 164 mg/dL    Today's Vitals: /80     Recent Labs   Lab Test 02/14/17  0925 02/05/16  0905 02/04/15  0956 02/11/14  0822   CHOL 164 129 153 134   HDL 65 59 64 56   LDL 74 55 64 65   TRIG 124 77 123 64   CHOLHDLRATIO  --   --  2.4 2.0       ASSESSMENT:                              Medication Adherence: good, pt would likely benefit from checking with [employer] Prescription Assistance Program to see if she is eligible for any help    Diabetes: Stable. Patient is close to meeting " A1c goal of < 7%. After discussion about side effects of pioglitazone patient prefers to continue Jardiance at this time.      Hypertension: Stable. Patient is meeting BP goal of < 140/90mmHg.     Hyperlipidemia: Stable. Pt is on moderate intensity statin which is indicated based on 2019 ACC/AHA guidelines for lipid management.  Further increase in statin dose not warranted as LDL is well controlled (close to or less than 40mg/dL). Pt may benefit from change in tablet strength to avoid need for cutting tablets.     PLAN:                            1. Contact the [employer] Prescription Assistance Program/Fund (Hannah Paul and Renetta Sam) at [phone].  2. Refilled medications/corrected doses following med rec - Jardiance, hydrochlorothiazide, simvastatin (40 mg tablets), glipizide XL (10 mg tablets), metformin, atenolol    I spent 45 minutes with this patient today. All changes were made via collaborative practice agreement with Dr. Rene Sharma. A copy of the visit note was provided to the patient's primary care provider.    Will follow up in 3-6 months if needed.    The patient was given a summary of these recommendations as an after visit summary.     New Coffey, MannieD, [facility] HospitalCP  Medication Therapy Management Pharmacist  Pager: [phone]

## 2019-11-13 NOTE — PATIENT INSTRUCTIONS
Recommendations from today's MTM visit:                                                    MTM (medication therapy management) is a service provided by a clinical pharmacist designed to help you get the most of out of your medicines.   Today we reviewed what your medicines are for, how to know if they are working, that your medicines are safe and how to make your medicine regimen as easy as possible.     1. Start taking your Jardiance 10 mg every morning.  This medication works 24 hours a day but taking earlier in the day helps to limit the nighttime urination effects that this could be contributing to.    2. Contact the [place] Prescription Assistance Program/[place] ([employer]annah Paul and Renetta Sam) at [phone]. They may be able to help to see if you qualify for any assistance from the drug companies.     3. I refilled your medications for next year and sent them to [facility] in [place] under Dr. Sharma's name.  Note: SIMVASTATIN was refilled as a 40 MG tablet - so with your next refill you should take 1 tablet (40 MG) by mouth every evening.  You will not need to split this dose.     It was great to speak with you today.  I value your experience and would be very thankful for your time with providing feedback on our clinic survey. You may receive a survey via email or text message in the next few days.     Next MTM visit: 3-6 months or sooner if needed    To schedule another MTM appointment, please call the clinic directly or you may call the MTM scheduling line at [phone] or toll-free at [phone].     My Clinical Pharmacist's contact information:                                                      It was a pleasure talking with you today!  Please feel free to contact me with any questions or concerns you have.      New Coffey, PharmD, BCACP  Medication Therapy Management Pharmacist  Pager: [phone]

## 2019-11-13 NOTE — Clinical Note
FYI - patient may reach out to see if she is eligible for Jardiance assistance - alternative SGLT2 inhibitors would likely be okay as wellNew Coffey, PharmD, BCACPMedication Therapy Management PharmacistPager: [phone]

## 2019-11-13 NOTE — Clinical Note
FYI - patient preferring to stay on Jardiance after discussion of Actos side effects. Will connect her with [employer] Paul[employer] Prescription Assistance Program to see if she is eligible for Jardiance program.  Did find to med rec issues for glipizide (had been taking 10 mg twice daily this past year) and simvastatin (only was taking half-tablet = 40 mg).  Corrected these in chart.New Coffey, MannieD, BCACPMedication Therapy Management PharmacistPager: [phone]

## 2020-01-20 ENCOUNTER — TELEPHONE (OUTPATIENT)
Dept: INTERNAL MEDICINE | Facility: CLINIC | Age: 70
End: 2020-01-20

## 2020-01-20 NOTE — PROGRESS NOTES
"Subjective     Rylie Young is a 69 year old female who presents to clinic today for the following health issues:    HPI   Acute Illness   Acute illness concerns: Cough  Onset: Off and on since     Fever: YES- Feels feverish has not taken temperature     Chills/Sweats: YES    Headache (location?): YES    Sinus Pressure:YES    Conjunctivitis:  no    Ear Pain: YES- Below the ears     Rhinorrhea: YES    Congestion: YES    Sore Throat: YES- Cough     Cough: YES    Wheeze: YES- \"When it gets worse\"    Decreased Appetite: YES    Nausea: no    Vomiting: no    Diarrhea:  YES    Dysuria/Freq.: no    Fatigue/Achiness: YES    Sick/Strep Exposure: no     Therapies Tried and outcome: Rest, ibuprofen    Patient Active Problem List   Diagnosis     Edema     HYPERLIPIDEMIA LDL GOAL <100     Hypertension goal BP (blood pressure) < 140/90     Advanced directives, counseling/discussion     Type 2 diabetes mellitus without complication (H)     Past Surgical History:   Procedure Laterality Date     COLONOSCOPY  10/08/2007    Internal hemorrhoids. Diverticulosis.     COLONOSCOPY N/A 4/3/2019    Procedure: COLONOSCOPY;  Surgeon: Rafael Parsons DO;  Location:  GI     TUBAL LIGATION         Social History     Tobacco Use     Smoking status: Never Smoker     Smokeless tobacco: Never Used   Substance Use Topics     Alcohol use: No     Family History   Problem Relation Age of Onset     C.A.D. Mother          at 40 from CHF     Respiratory Mother         TB     C.A.D. Father          at 57 from MI     C.A.D. Brother         CABG     Cerebrovascular Disease Brother      Cerebrovascular Disease Daughter      Breast Cancer Maternal Aunt      Neurologic Disorder Maternal Aunt         brain tumor     Heart Disease Brother 55        MI         Current Outpatient Medications   Medication Sig Dispense Refill     aspirin 81 MG tablet Take 81 mg by mouth daily  30 tablet      aspirin-acetaminophen-caffeine (EXCEDRIN " EXTRA STRENGTH) 250-250-65 MG per tablet Take 1 tablet by mouth as needed.       atenolol (TENORMIN) 50 MG tablet Take 1 tablet (50 mg) by mouth daily 90 tablet 3     blood glucose (ACCU-CHEK CHUCHO PLUS) test strip USE AS DIRECTED TO TEST BLOOD SUGARS TWO TIMES A DAY OR AS DIRECTED 100 each 5     blood glucose monitoring (ACCU-CHEK CHUCHO PLUS) meter device kit Use to test blood sugars two times daily or as directed. 1 kit 0     blood glucose monitoring (SOFTCLIX) lancets Use to test blood sugar two times daily or as directed. 2 Box 2     empagliflozin (JARDIANCE) 10 MG TABS tablet Take 1 tablet (10 mg) by mouth daily 90 tablet 3     glipiZIDE (GLUCOTROL XL) 10 MG 24 hr tablet Take 1 tablet (10 mg) by mouth 2 times daily 180 tablet 1     guaiFENesin-codeine (ROBITUSSIN AC) 100-10 MG/5ML solution Take 5-10 mLs by mouth every 4 hours as needed for cough 240 mL 0     hydrochlorothiazide (HYDRODIURIL) 25 MG tablet Take 1 tablet (25 mg) by mouth every morning 90 tablet 1     metFORMIN (GLUCOPHAGE) 500 MG tablet Take 2 tablets (1,000 mg) by mouth 2 times daily (with meals) 360 tablet 1     MULTIVITAMINS OR TABS Take 1 tablet by mouth daily  100 1 YEAR     simvastatin (ZOCOR) 40 MG tablet Take 1 tablet (40 mg) by mouth daily 90 tablet 1     No Known Allergies  Recent Labs   Lab Test 10/09/19  1106 06/19/19  1041 02/14/17  0925 02/05/16  0905  02/04/15  0956   A1C 7.0* 8.5* 7.8* 7.5*   < > 8.8*   LDL  --   --  74 55  --  64   HDL  --   --  65 59  --  64   TRIG  --   --  124 77  --  123   ALT  --  27 25 23  --  21   CR  --  0.92 0.74 0.86  --  0.99   GFRESTIMATED  --  62 78 66  --  56*   GFRESTBLACK  --  72 >90   GFR Calc   80  --  68   POTASSIUM  --  4.0 3.9 3.7  --  3.9   TSH  --  0.71 0.70  --   --  0.84    < > = values in this interval not displayed.      BP Readings from Last 3 Encounters:   01/21/20 134/82   11/13/19 132/80   10/09/19 132/78    Wt Readings from Last 3 Encounters:   01/21/20 68.2 kg (150  "lb 6.4 oz)   10/09/19 70.8 kg (156 lb 1.6 oz)   07/15/19 71.7 kg (158 lb)                    Reviewed and updated as needed this visit by Provider         Review of Systems   ROS COMP: Constitutional, HEENT, cardiovascular, pulmonary, GI, , musculoskeletal, neuro, skin, endocrine and psych systems are negative, except as otherwise noted.      Objective    /82   Pulse 72   Temp 98.1  F (36.7  C) (Temporal)   Resp 16   Ht 1.6 m (5' 3\")   Wt 68.2 kg (150 lb 6.4 oz)   SpO2 97%   BMI 26.64 kg/m    Body mass index is 26.64 kg/m .  Physical Exam   GENERAL: healthy, alert and no distress  NECK: no adenopathy, no asymmetry, masses, or scars and thyroid normal to palpation  RESP: lungs clear to auscultation - no rales, rhonchi or wheezes  CV: regular rate and rhythm, normal S1 S2, no S3 or S4, no murmur, click or rub, no peripheral edema and peripheral pulses strong  ABDOMEN: soft, nontender, no hepatosplenomegaly, no masses and bowel sounds normal  MS: no gross musculoskeletal defects noted, no edema  NEURO: Normal strength and tone, mentation intact and speech normal  PSYCH: mentation appears normal, affect normal/bright    Diagnostic Test Results:  Labs reviewed in Epic  No results found for this or any previous visit (from the past 24 hour(s)).        Assessment & Plan     1. Viral URI with cough  More than likely viral given overall presentation to the clinic as well is vital signs and physical exam.  Advised against antibiotics given the fact that her blood sugars remained stable as well by her report.  - guaiFENesin-codeine (ROBITUSSIN AC) 100-10 MG/5ML solution; Take 5-10 mLs by mouth every 4 hours as needed for cough  Dispense: 240 mL; Refill: 0    2. Type 2 diabetes mellitus without complication, without long-term current use of insulin (H)  - guaiFENesin-codeine (ROBITUSSIN AC) 100-10 MG/5ML solution; Take 5-10 mLs by mouth every 4 hours as needed for cough  Dispense: 240 mL; Refill: 0     BMI: " "  Estimated body mass index is 26.64 kg/m  as calculated from the following:    Height as of this encounter: 1.6 m (5' 3\").    Weight as of this encounter: 68.2 kg (150 lb 6.4 oz).   Weight management plan: Patient was referred to their PCP to discuss a diet and exercise plan.        Work on weight loss  Regular exercise  Plenty fluids and over-the-counter vitamin C at gram doses daily recommended for viral illness.  Follow-up in 2 weeks if not improved or sooner if a fever develops.    Return in about 2 weeks (around 2/4/2020) for recheck of current condition, if symptoms do not improve.    Al Farrell PA-C  [facility]    "

## 2020-01-20 NOTE — TELEPHONE ENCOUNTER
Please call patient and reschedule. This spot is meant only for acute patient's not problems that have been ongoing since December. Thank you. We will be reaching out to Dr. Sharma's team as well to see about working this patient into their schedule.   Ruth Hagen on 1/20/2020 at 1:43 PM

## 2020-01-20 NOTE — TELEPHONE ENCOUNTER
Patient is currently on Dr. Moore's schedule for Wednesday January 22.  This was incorrectly done as this spot is for acute patient's only. Patient has had a cough since December.  Dr. Moore will be taking this patient off of the schedule.  Would you be able to work this patient is later this week? Thank you.  Ruth Hagen on 1/20/2020 at 1:48 PM

## 2020-01-21 ENCOUNTER — TELEPHONE (OUTPATIENT)
Dept: INTERNAL MEDICINE | Facility: CLINIC | Age: 70
End: 2020-01-21

## 2020-01-21 ENCOUNTER — OFFICE VISIT (OUTPATIENT)
Dept: FAMILY MEDICINE | Facility: OTHER | Age: 70
End: 2020-01-21
Payer: MEDICARE

## 2020-01-21 VITALS
WEIGHT: 150.4 LBS | OXYGEN SATURATION: 97 % | TEMPERATURE: 98.1 F | RESPIRATION RATE: 16 BRPM | HEIGHT: 63 IN | DIASTOLIC BLOOD PRESSURE: 82 MMHG | BODY MASS INDEX: 26.65 KG/M2 | HEART RATE: 72 BPM | SYSTOLIC BLOOD PRESSURE: 134 MMHG

## 2020-01-21 DIAGNOSIS — J06.9 VIRAL URI WITH COUGH: Primary | ICD-10-CM

## 2020-01-21 DIAGNOSIS — E11.9 TYPE 2 DIABETES MELLITUS WITHOUT COMPLICATION, WITHOUT LONG-TERM CURRENT USE OF INSULIN (H): ICD-10-CM

## 2020-01-21 PROCEDURE — 99214 OFFICE O/P EST MOD 30 MIN: CPT | Performed by: PHYSICIAN ASSISTANT

## 2020-01-21 RX ORDER — CODEINE PHOSPHATE AND GUAIFENESIN 10; 100 MG/5ML; MG/5ML
1-2 SOLUTION ORAL EVERY 4 HOURS PRN
Qty: 240 ML | Refills: 0 | Status: SHIPPED | OUTPATIENT
Start: 2020-01-21 | End: 2020-06-17

## 2020-01-21 ASSESSMENT — MIFFLIN-ST. JEOR: SCORE: 1166.34

## 2020-01-21 ASSESSMENT — PAIN SCALES - GENERAL: PAINLEVEL: EXTREME PAIN (8)

## 2020-01-21 NOTE — PATIENT INSTRUCTIONS

## 2020-01-26 ENCOUNTER — HOSPITAL ENCOUNTER (EMERGENCY)
Facility: CLINIC | Age: 70
Discharge: HOME | End: 2020-01-26
Attending: FAMILY MEDICINE | Admitting: FAMILY MEDICINE
Payer: MEDICARE

## 2020-01-26 ENCOUNTER — APPOINTMENT (OUTPATIENT)
Dept: CT IMAGING | Facility: CLINIC | Age: 70
End: 2020-01-26
Attending: FAMILY MEDICINE
Payer: MEDICARE

## 2020-01-26 ENCOUNTER — APPOINTMENT (OUTPATIENT)
Dept: GENERAL RADIOLOGY | Facility: CLINIC | Age: 70
End: 2020-01-26
Attending: FAMILY MEDICINE
Payer: MEDICARE

## 2020-01-26 ENCOUNTER — NURSE TRIAGE (OUTPATIENT)
Dept: NURSING | Facility: CLINIC | Age: 70
End: 2020-01-26

## 2020-01-26 VITALS
RESPIRATION RATE: 16 BRPM | HEART RATE: 73 BPM | TEMPERATURE: 98.2 F | SYSTOLIC BLOOD PRESSURE: 141 MMHG | DIASTOLIC BLOOD PRESSURE: 87 MMHG | OXYGEN SATURATION: 96 %

## 2020-01-26 DIAGNOSIS — H81.10 BENIGN PAROXYSMAL POSITIONAL VERTIGO, UNSPECIFIED LATERALITY: ICD-10-CM

## 2020-01-26 LAB
ALBUMIN SERPL-MCNC: 3.7 G/DL (ref 3.4–5)
ALBUMIN UR-MCNC: NEGATIVE MG/DL
ALP SERPL-CCNC: 70 U/L (ref 40–150)
ALT SERPL W P-5'-P-CCNC: 29 U/L (ref 0–50)
ANION GAP SERPL CALCULATED.3IONS-SCNC: 10 MMOL/L (ref 3–14)
APPEARANCE UR: CLEAR
AST SERPL W P-5'-P-CCNC: 18 U/L (ref 0–45)
BASOPHILS # BLD AUTO: 0.1 10E9/L (ref 0–0.2)
BASOPHILS NFR BLD AUTO: 0.9 %
BILIRUB SERPL-MCNC: 0.3 MG/DL (ref 0.2–1.3)
BILIRUB UR QL STRIP: NEGATIVE
BUN SERPL-MCNC: 20 MG/DL (ref 7–30)
CALCIUM SERPL-MCNC: 9.9 MG/DL (ref 8.5–10.1)
CHLORIDE SERPL-SCNC: 104 MMOL/L (ref 94–109)
CO2 SERPL-SCNC: 27 MMOL/L (ref 20–32)
COLOR UR AUTO: YELLOW
CREAT SERPL-MCNC: 0.77 MG/DL (ref 0.52–1.04)
DIFFERENTIAL METHOD BLD: NORMAL
EOSINOPHIL NFR BLD AUTO: 2.9 %
ERYTHROCYTE [DISTWIDTH] IN BLOOD BY AUTOMATED COUNT: 12.5 % (ref 10–15)
GFR SERPL CREATININE-BSD FRML MDRD: 78 ML/MIN/{1.73_M2}
GLUCOSE SERPL-MCNC: 159 MG/DL (ref 70–99)
GLUCOSE UR STRIP-MCNC: >499 MG/DL
HCT VFR BLD AUTO: 43.5 % (ref 35–47)
HGB BLD-MCNC: 14.9 G/DL (ref 11.7–15.7)
HGB UR QL STRIP: NEGATIVE
IMM GRANULOCYTES # BLD: 0 10E9/L (ref 0–0.4)
IMM GRANULOCYTES NFR BLD: 0.3 %
KETONES UR STRIP-MCNC: 5 MG/DL
LEUKOCYTE ESTERASE UR QL STRIP: NEGATIVE
LYMPHOCYTES # BLD AUTO: 2.8 10E9/L (ref 0.8–5.3)
LYMPHOCYTES NFR BLD AUTO: 36.1 %
MCH RBC QN AUTO: 32.7 PG (ref 26.5–33)
MCHC RBC AUTO-ENTMCNC: 34.3 G/DL (ref 31.5–36.5)
MCV RBC AUTO: 95 FL (ref 78–100)
MONOCYTES # BLD AUTO: 0.6 10E9/L (ref 0–1.3)
MONOCYTES NFR BLD AUTO: 7.9 %
NEUTROPHILS # BLD AUTO: 4 10E9/L (ref 1.6–8.3)
NEUTROPHILS NFR BLD AUTO: 51.9 %
NITRATE UR QL: NEGATIVE
NRBC # BLD AUTO: 0 10*3/UL
NRBC BLD AUTO-RTO: 0 /100
PH UR STRIP: 6 PH (ref 5–7)
PLATELET # BLD AUTO: 293 10E9/L (ref 150–450)
POTASSIUM SERPL-SCNC: 3.5 MMOL/L (ref 3.4–5.3)
PROT SERPL-MCNC: 7.7 G/DL (ref 6.8–8.8)
RBC # BLD AUTO: 4.56 10E12/L (ref 3.8–5.2)
SODIUM SERPL-SCNC: 141 MMOL/L (ref 133–144)
SOURCE: ABNORMAL
SP GR UR STRIP: 1.03 (ref 1–1.03)
TROPONIN I SERPL-MCNC: <0.015 UG/L (ref 0–0.04)
UROBILINOGEN UR STRIP-MCNC: 0 MG/DL (ref 0–2)
WBC # BLD AUTO: 7.6 10E9/L (ref 4–11)

## 2020-01-26 PROCEDURE — 99285 EMERGENCY DEPT VISIT HI MDM: CPT | Mod: 25 | Performed by: FAMILY MEDICINE

## 2020-01-26 PROCEDURE — 85025 COMPLETE CBC W/AUTO DIFF WBC: CPT | Performed by: FAMILY MEDICINE

## 2020-01-26 PROCEDURE — 84484 ASSAY OF TROPONIN QUANT: CPT | Performed by: FAMILY MEDICINE

## 2020-01-26 PROCEDURE — 80053 COMPREHEN METABOLIC PANEL: CPT | Performed by: FAMILY MEDICINE

## 2020-01-26 PROCEDURE — 81003 URINALYSIS AUTO W/O SCOPE: CPT | Performed by: FAMILY MEDICINE

## 2020-01-26 PROCEDURE — 93010 ELECTROCARDIOGRAM REPORT: CPT | Mod: Z6 | Performed by: FAMILY MEDICINE

## 2020-01-26 PROCEDURE — 93005 ELECTROCARDIOGRAM TRACING: CPT | Performed by: FAMILY MEDICINE

## 2020-01-26 PROCEDURE — 71046 X-RAY EXAM CHEST 2 VIEWS: CPT | Mod: TC

## 2020-01-26 PROCEDURE — 70450 CT HEAD/BRAIN W/O DYE: CPT

## 2020-01-26 RX ORDER — MECLIZINE HYDROCHLORIDE 25 MG/1
25 TABLET ORAL EVERY 6 HOURS PRN
Qty: 15 TABLET | Refills: 1
Start: 2020-01-26

## 2020-01-26 RX ORDER — SODIUM CHLORIDE 9 MG/ML
1000 INJECTION, SOLUTION INTRAVENOUS CONTINUOUS
Status: DISCONTINUED | OUTPATIENT
Start: 2020-01-26 | End: 2020-01-26 | Stop reason: HOSPADM

## 2020-01-26 NOTE — ED AVS SNAPSHOT
[facility] Emergency Department  [address] [place] [address]  Phone:  [phone]  Fax:  [phone]                                    Rylie Young   MRN: [id number]    Department:  [facility] Emergency Department   Date of Visit:  1/26/2020           After Visit Summary Signature Page    I have received my discharge instructions, and my questions have been answered. I have discussed any challenges I see with this plan with the nurse or doctor.    ..........................................................................................................................................  Patient/Patient Representative Signature      ..........................................................................................................................................  Patient Representative Print Name and Relationship to Patient    ..................................................               ................................................  Date                                   Time    ..........................................................................................................................................  Reviewed by Signature/Title    ...................................................              ..............................................  Date                                               Time          22EPIC Rev 08/18

## 2020-01-26 NOTE — ED PROVIDER NOTES
"  History     Chief Complaint   Patient presents with     Dizziness     The history is provided by the patient.     Rylie Young is a 69 year old female who is presenting to the emergency department with complaints of dizziness. The patient states that she had a small dizzy spell yesterday and another today, but notes today it was a \"big one\". She had another previous big one last week. She thinks they last about five to ten minutes, but says it feels like \"forever\". She says the room starts spinning and she loses control of \"everything\". When her dizzy spell came on today she was standing up and walking over to her recliner. She says she was picking things up and bending slightly at the waist, but claims she did not fully bend over. Immediately she started to feel dizzy and everything started to go \"messy\". She says she was struggling and falling while trying to sit down. When she got into the chair she felt like she was falling backwards and her dizziness did not subside for awhile. The patient admits to feeling lightheaded, has a headache, and ringing in her ears. She notes that the ringing in her ears is usually from her blowing her nose. Her headache is in the back of her head and is currently still present. She denies any numbness or tingling in her hands or feet. She has been eating and drinking normally. She has not felt any chest pain or irregular beating of her heart.   When walking she has to \"be careful\" because things feel off balance. The patient says she has been sick with a cold and has been coughing, but says this is not new. She claims her ribs and back are sore from coughing. She mentions that she was seen for these symptoms on 01/21/2020 and was diagnosed with a viral URI. She has never been diagnosed with vertigo or previous inner ear issues relating to balance.     Allergies:  No Known Allergies    Problem List:    Patient Active Problem List    Diagnosis Date Noted     Type 2 diabetes " mellitus without complication (H) 10/20/2015     Priority: Medium     Hypertension goal BP (blood pressure) < 140/90 2012     Priority: Medium     Advanced directives, counseling/discussion 2012     Priority: Medium     Advance Directive Problem List Overview:   Name Relationship Phone    Primary Health Care Agent            Alternative Health Care Agent          Discussed advance care planning with patient; however, patient declined at this time. 2012        Michael Mata MD  [id number]         HYPERLIPIDEMIA LDL GOAL <100 10/31/2010     Priority: Medium     Edema      Priority: Medium        Past Medical History:    Past Medical History:   Diagnosis Date     Diabetic eye exam (H) 2014     Edema      Essential hypertension, benign      Mixed hyperlipidemia      Other forms of migraine, with intractable migraine, so stated, without mention of status migrainosus      Type II or unspecified type diabetes mellitus without mention of complication, not stated as uncontrolled        Past Surgical History:    Past Surgical History:   Procedure Laterality Date     COLONOSCOPY  10/08/2007    Internal hemorrhoids. Diverticulosis.     COLONOSCOPY N/A 4/3/2019    Procedure: COLONOSCOPY;  Surgeon: Rafael Parsons DO;  Location: [facility]     TUBAL LIGATION         Family History:    Family History   Problem Relation Age of Onset     C.A.D. Mother          at 40 from CHF     Respiratory Mother         TB     C.A.D. Father          at 57 from MI     C.A.D. Brother         CABG     Cerebrovascular Disease Brother      Cerebrovascular Disease Daughter      Breast Cancer Maternal Aunt      Neurologic Disorder Maternal Aunt         brain tumor     Heart Disease Brother 55        MI       Social History:  Marital Status:   [5]  Social History     Tobacco Use     Smoking status: Never Smoker     Smokeless tobacco: Never Used   Substance Use Topics     Alcohol use: No     Drug use:  No        Medications:    aspirin 81 MG tablet  aspirin-acetaminophen-caffeine (EXCEDRIN EXTRA STRENGTH) 250-250-65 MG per tablet  atenolol (TENORMIN) 50 MG tablet  blood glucose (ACCU-CHEK CHUCHO PLUS) test strip  blood glucose monitoring (ACCU-CHEK CHUCHO PLUS) meter device kit  blood glucose monitoring (SOFTCLIX) lancets  empagliflozin (JARDIANCE) 10 MG TABS tablet  glipiZIDE (GLUCOTROL XL) 10 MG 24 hr tablet  guaiFENesin-codeine (ROBITUSSIN AC) 100-10 MG/5ML solution  hydrochlorothiazide (HYDRODIURIL) 25 MG tablet  metFORMIN (GLUCOPHAGE) 500 MG tablet  MULTIVITAMINS OR TABS  simvastatin (ZOCOR) 40 MG tablet          Review of Systems   All other systems reviewed and are negative.      Physical Exam   BP: (!) 150/100(right arm)  Heart Rate: 75  Temp: 98.2  F (36.8  C)  Resp: 16  SpO2: 96 %      Physical Exam  Vitals signs and nursing note reviewed.   Constitutional:       General: She is not in acute distress.     Appearance: She is well-developed. She is not diaphoretic.   HENT:      Head: Normocephalic and atraumatic.      Nose: Nose normal.      Mouth/Throat:      Pharynx: No oropharyngeal exudate.   Eyes:      Conjunctiva/sclera: Conjunctivae normal.   Neck:      Musculoskeletal: Normal range of motion and neck supple.   Cardiovascular:      Rate and Rhythm: Normal rate and regular rhythm.      Heart sounds: Normal heart sounds. No murmur. No friction rub.   Pulmonary:      Effort: Pulmonary effort is normal. No respiratory distress.      Breath sounds: Normal breath sounds. No stridor. No wheezing or rales.   Abdominal:      General: Bowel sounds are normal. There is no distension.      Palpations: Abdomen is soft. There is no mass.      Tenderness: There is no abdominal tenderness. There is no guarding.   Musculoskeletal: Normal range of motion.         General: No tenderness.   Skin:     General: Skin is warm and dry.      Capillary Refill: Capillary refill takes less than 2 seconds.      Findings: No  erythema.   Neurological:      Mental Status: She is alert and oriented to person, place, and time.   Psychiatric:         Judgment: Judgment normal.         ED Course        Procedures                EKG Interpretation:      Interpreted by Michael Mata  Time reviewed: now   Symptoms at time of EKG: now   Rhythm: normal sinus   Rate: normal  Axis: NORMAL  Ectopy: none  Conduction: normal  ST Segments/ T Waves: No ST-T wave changes  Q Waves: none  Comparison to prior: No old EKG available    Clinical Impression: normal EKG  Results for orders placed or performed during the hospital encounter of 01/26/20 (from the past 24 hour(s))   CBC with platelets differential   Result Value Ref Range    WBC 7.6 4.0 - 11.0 10e9/L    RBC Count 4.56 3.8 - 5.2 10e12/L    Hemoglobin 14.9 11.7 - 15.7 g/dL    Hematocrit 43.5 35.0 - 47.0 %    MCV 95 78 - 100 fl    MCH 32.7 26.5 - 33.0 pg    MCHC 34.3 31.5 - 36.5 g/dL    RDW 12.5 10.0 - 15.0 %    Platelet Count 293 150 - 450 10e9/L    Diff Method Automated Method     % Neutrophils 51.9 %    % Lymphocytes 36.1 %    % Monocytes 7.9 %    % Eosinophils 2.9 %    % Basophils 0.9 %    % Immature Granulocytes 0.3 %    Nucleated RBCs 0 0 /100    Absolute Neutrophil 4.0 1.6 - 8.3 10e9/L    Absolute Lymphocytes 2.8 0.8 - 5.3 10e9/L    Absolute Monocytes 0.6 0.0 - 1.3 10e9/L    Absolute Basophils 0.1 0.0 - 0.2 10e9/L    Abs Immature Granulocytes 0.0 0 - 0.4 10e9/L    Absolute Nucleated RBC 0.0    Comprehensive metabolic panel   Result Value Ref Range    Sodium 141 133 - 144 mmol/L    Potassium 3.5 3.4 - 5.3 mmol/L    Chloride 104 94 - 109 mmol/L    Carbon Dioxide 27 20 - 32 mmol/L    Anion Gap 10 3 - 14 mmol/L    Glucose 159 (H) 70 - 99 mg/dL    Urea Nitrogen 20 7 - 30 mg/dL    Creatinine 0.77 0.52 - 1.04 mg/dL    GFR Estimate 78 >60 mL/min/[1.73_m2]    GFR Estimate If Black >90 >60 mL/min/[1.73_m2]    Calcium 9.9 8.5 - 10.1 mg/dL    Bilirubin Total 0.3 0.2 - 1.3 mg/dL    Albumin 3.7 3.4 -  5.0 g/dL    Protein Total 7.7 6.8 - 8.8 g/dL    Alkaline Phosphatase 70 40 - 150 U/L    ALT 29 0 - 50 U/L    AST 18 0 - 45 U/L   Troponin I   Result Value Ref Range    Troponin I ES <0.015 0.000 - 0.045 ug/L   XR Chest 2 Views    Narrative    CHEST TWO VIEWS   1/26/2020 3:01 PM    HISTORY: Cough, chest pain.    COMPARISON:  None.    FINDINGS: The heart size is normal. There is a large hiatal hernia. No  other mediastinal pathology is seen. The lungs are clear. The  pulmonary vasculature is normal. No pneumothorax or pleural effusion  is seen.      Impression    IMPRESSION: Large hiatal hernia.    KEEGAN HOLLIDAY MD   CT Head w/o Contrast    Narrative    Clinical History: intermittent dizziness    Technique: Noncontrast CT images of the brain obtained. This CT scan  used dose modulation, iterative reconstruction, and/or weight based  dosing when appropriate to reduce radiation dose to as low as  reasonably achievable.     Comparison: None available.     Findings: No intracranial hemorrhage, mass effect, midline shift or  abnormal extra axial fluid collection. Moderate leukoaraiosis and mild  generalized parenchymal volume loss. The ventricles are not enlarged  out of proportion to the cerebral sulci. The gray-white matter  differentiation is intact.    Mild scattered paranasal sinus mucosal thickening. The mastoid air  cells are clear. No fracture.      Impression    Impression:   1. No acute intracranial pathology.  2. Moderate chronic small vessel ischemic disease and mild generalized  parenchymal volume loss.    ELVIA HAY MD   UA reflex to Microscopic and Culture   Result Value Ref Range    Color Urine Yellow     Appearance Urine Clear     Glucose Urine >499 (A) NEG^Negative mg/dL    Bilirubin Urine Negative NEG^Negative    Ketones Urine 5 (A) NEG^Negative mg/dL    Specific Gravity Urine 1.026 1.003 - 1.035    Blood Urine Negative NEG^Negative    pH Urine 6.0 5.0 - 7.0 pH    Protein Albumin Urine  Negative NEG^Negative mg/dL    Urobilinogen mg/dL 0.0 0.0 - 2.0 mg/dL    Nitrite Urine Negative NEG^Negative    Leukocyte Esterase Urine Negative NEG^Negative    Source Midstream Urine      Medications   0.9% sodium chloride BOLUS (has no administration in time range)     Followed by   sodium chloride 0.9% infusion (has no administration in time range)     Labs were reviewed and were completely unremarkable.  Patient had negative troponin and there is no signs of anemia.  CT scan of the head and chest were unremarkable.  Patient has had no more spells while here.  Patient has ambulated to the bathroom a couple of times without any difficulty.  No rush of movement that comes and goes and no other symptoms with this the sounds suspicious for more benign paroxysmal positional vertigo.  She had recently had a head cold which could have contributed to this.  I am going to discharge the patient home with some meclizine to use as needed.  I suspect this should resolve on its own but may come back again.  Patient was told to return if she develops any new symptoms along with this dizziness or if this last for prolonged period of time.    Assessments & Plan (with Medical Decision Making)  Benign paroxysmal positional vertigo     I have reviewed the nursing notes.    I have reviewed the findings, diagnosis, plan and need for follow up with the patient.                This document serves as a record of services personally performed by Michael Mata, *. It was created on their behalf by Kenia Vicente, a trained medical scribe. The creation of this record is based on the provider's personal observations and the statements of the patient. This document has been checked and approved by the attending provider.  Note: Chart documentation done in part with Dragon Voice Recognition software. Although reviewed after completion, some word and grammatical errors may remain.  1/26/2020   [facility], located within [facility]  DEPARTMENT

## 2020-01-26 NOTE — TELEPHONE ENCOUNTER
"Saw provider 4-5 days ago, everything was fine except for bad cough.  Yesterday was out shopping and had a dizzy spell, recovered quickly.  This am was getting ready for [employer], dizzy spell hit again, \"had no control over anything\"  Lasted for \"quite awhile\"  Made it to the bed and laid down.  Not sure if passed out or fell alseep.  Has been awake for a couple of hours, has a headache and hurts on base of head, \"where head and neck meet\".    Advised the patient that she needs to be seen in the eR.  She is going to try and call her sisiter to come and take her to the ER and intstructed her that if her sister can not get her to the ER in 1 hour, that she needs to call 911 to be taken in to be evaluated.    Saima Angulo RN on 1/26/2020 at 12:47 PM    Reason for Disposition    Patient sounds very sick or weak to the triager    Additional Information    Negative: Severe difficulty breathing (e.g., struggling for each breath, speaks in single words)    Negative: [1] Difficulty breathing or swallowing AND [2] started suddenly after medicine, an allergic food or bee sting    Negative: Shock suspected (e.g., cold/pale/clammy skin, too weak to stand, low BP, rapid pulse)    Negative: Difficult to awaken or acting confused (e.g., disoriented, slurred speech)    Negative: [1] Weakness (i.e., paralysis, loss of muscle strength) of the face, arm or leg on one side of the body AND [2] sudden onset AND [3] present now    Negative: [1] Numbness (i.e., loss of sensation) of the face, arm or leg on one side of the body AND [2] sudden onset AND [3] present now    Negative: [1] Loss of speech or garbled speech AND [2] sudden onset AND [3] present now    Negative: Overdose (accidental or intentional) of medications    Negative: [1] Fainted > 15 minutes ago AND [2] still feels too weak or dizzy to stand    Negative: Heart beating < 50 beats per minute OR > 140 beats per minute    Negative: Sounds like a life-threatening emergency to " "the triager    Negative: Difficulty breathing    Negative: [1] Drinking very little AND [2] dehydration suspected (e.g., no urine > 12 hours, very dry mouth, very lightheaded)    Negative: Extra heart beats OR irregular heart beating  (i.e., \"palpitations\")    Negative: SEVERE dizziness (e.g., unable to stand, requires support to walk, feels like passing out now)    Protocols used: DIZZINESS - [id number]      "

## 2020-06-05 DIAGNOSIS — E11.9 TYPE 2 DIABETES MELLITUS WITHOUT COMPLICATION, WITHOUT LONG-TERM CURRENT USE OF INSULIN (H): ICD-10-CM

## 2020-06-05 DIAGNOSIS — E78.5 HYPERLIPIDEMIA LDL GOAL <100: ICD-10-CM

## 2020-06-05 DIAGNOSIS — I10 HYPERTENSION GOAL BP (BLOOD PRESSURE) < 140/90: ICD-10-CM

## 2020-06-10 RX ORDER — GLIPIZIDE 10 MG/1
TABLET, FILM COATED, EXTENDED RELEASE ORAL
Qty: 60 TABLET | Refills: 0 | Status: SHIPPED | OUTPATIENT
Start: 2020-06-10 | End: 2020-06-17

## 2020-06-10 RX ORDER — HYDROCHLOROTHIAZIDE 25 MG/1
TABLET ORAL
Qty: 90 TABLET | Refills: 0 | Status: SHIPPED | OUTPATIENT
Start: 2020-06-10 | End: 2020-06-17

## 2020-06-10 RX ORDER — SIMVASTATIN 40 MG
TABLET ORAL
Qty: 30 TABLET | Refills: 0 | Status: SHIPPED | OUTPATIENT
Start: 2020-06-10 | End: 2020-09-04

## 2020-06-10 NOTE — TELEPHONE ENCOUNTER
simvastatin (ZOCOR) 40 MG tablet [Pharmacy Med Name: Simvastatin 40 MG Oral Tablet] 90 tablet 0    Sig: Take 1 tablet by mouth once daily   Routing refill request to provider for review/approval because:  Labs not current:  LDL  Lab Test 02/14/17  0925   LDL 74     T'd up 1 month for provider review.    Loida Barraza RN

## 2020-06-10 NOTE — TELEPHONE ENCOUNTER
"Requested Prescriptions   Pending Prescriptions Disp Refills     glipiZIDE (GLUCOTROL XL) 10 MG 24 hr tablet [Pharmacy Med Name: glipiZIDE ER 10 MG Oral Tablet Extended Release 24 Hour] 180 tablet 0     Sig: Take 1 tablet by mouth twice daily   Last Written Prescription Date:  11/13/2019  Last Fill Quantity: 180,  # refills: 1   Last office visit: 1/21/2020   Future Office Visit:        Sulfonylurea Agents Failed - 6/5/2020 10:45 AM        Failed - Patient has documented A1c within the specified period of time.     If HgbA1C is 8 or greater, it needs to be on file within the past 3 months.  If less than 8, must be on file within the past 6 months.     Recent Labs   Lab Test 10/09/19  1106   A1C 7.0*           Failed - Recent (6 mo) or future (30 days) visit within the authorizing provider's specialty     Patient had office visit in the last 6 months or has a visit in the next 30 days with authorizing provider or within the authorizing provider's specialty.  See \"Patient Info\" tab in inbasket, or \"Choose Columns\" in Meds & Orders section of the refill encounter.            Passed - Medication is active on med list        Passed - Patient is age 18 or older        Passed - No active pregnancy on record        Passed - Patient has a recent creatinine (normal) within the past 12 mos.     Recent Labs   Lab Test 01/26/20  1448   CR 0.77     Ok to refill medication if creatinine is low          Passed - Patient has not had a positive pregnancy test within the past 12 mos.      Medication is being filled for 1 time refill only due to:  Patient needs labs A1C.       hydrochlorothiazide (HYDRODIURIL) 25 MG tablet [Pharmacy Med Name: hydroCHLOROthiazide 25 MG Oral Tablet] 90 tablet 0     Sig: TAKE 1 TABLET BY MOUTH IN THE MORNING   Last Written Prescription Date:  11/13/2019  Last Fill Quantity: 90,  # refills: 1   Last office visit: 1/21/2020   Future Office Visit:      Diuretics (Including Combos) Protocol Failed - 6/5/2020 " "10:45 AM        Failed - Blood pressure under 140/90 in past 12 months     BP Readings from Last 3 Encounters:   01/26/20 (!) 141/87   01/21/20 134/82   11/13/19 132/80           Passed - Recent (12 mo) or future (30 days) visit within the authorizing provider's specialty     Patient has had an office visit with the authorizing provider or a provider within the authorizing providers department within the previous 12 mos or has a future within next 30 days. See \"Patient Info\" tab in inbasket, or \"Choose Columns\" in Meds & Orders section of the refill encounter.            Passed - Medication is active on med list        Passed - Patient is age 18 or older        Passed - No active pregancy on record        Passed - Normal serum creatinine on file in past 12 months     Recent Labs   Lab Test 01/26/20  1448   CR 0.77            Passed - Normal serum potassium on file in past 12 months     Recent Labs   Lab Test 01/26/20  1448   POTASSIUM 3.5            Passed - Normal serum sodium on file in past 12 months     Recent Labs   Lab Test 01/26/20  1448               Passed - No positive pregnancy test in past 12 months      Prescription [place] per [facility] [facility].       simvastatin (ZOCOR) 40 MG tablet [Pharmacy Med Name: Simvastatin 40 MG Oral Tablet] 90 tablet 0     Sig: Take 1 tablet by mouth once daily   Last Written Prescription Date:  11/13/2019  Last Fill Quantity: 90,  # refills: 1   Last office visit: 1/21/2020   Future Office Visit:      Statins Protocol Failed - 6/5/2020 10:45 AM        Failed - LDL on file in past 12 months     Recent Labs   Lab Test 02/14/17  0925   LDL 74           Passed - No abnormal creatine kinase in past 12 months     No lab results found.           Passed - Recent (12 mo) or future (30 days) visit within the authorizing provider's specialty     Patient has had an office visit with the authorizing provider or a provider within the authorizing providers department within the " "previous 12 mos or has a future within next 30 days. See \"Patient Info\" tab in inbasket, or \"Choose Columns\" in Meds & Orders section of the refill encounter.            Passed - Medication is active on med list        Passed - Patient is age 18 or older        Passed - No active pregnancy on record        Passed - No positive pregnancy test in past 12 months      Routing refill request to provider for review/approval          metFORMIN (GLUCOPHAGE) 500 MG tablet [Pharmacy Med Name: metFORMIN HCl 500 MG Oral Tablet] 360 tablet 0     Sig: TAKE 2 TABLETS BY MOUTH TWICE DAILY WITH MEALS   Last Written Prescription Date:  11/13/2019  Last Fill Quantity: 360,  # refills: 1   Last office visit: 1/21/2020   Future Office Visit:      Biguanide Agents Failed - 6/5/2020 10:45 AM        Failed - Patient has documented A1c within the specified period of time.     If HgbA1C is 8 or greater, it needs to be on file within the past 3 months.  If less than 8, must be on file within the past 6 months.     Recent Labs   Lab Test 10/09/19  1106   A1C 7.0*           Failed - Recent (6 mo) or future (30 days) visit within the authorizing provider's specialty     Patient had office visit in the last 6 months or has a visit in the next 30 days with authorizing provider or within the authorizing provider's specialty.  See \"Patient Info\" tab in inbasket, or \"Choose Columns\" in Meds & Orders section of the refill encounter.            Passed - Patient is age 10 or older        Passed - Patient's CR is NOT>1.4 OR Patient's EGFR is NOT<45 within past 12 mos.     Recent Labs   Lab Test 01/26/20  1448   GFRESTIMATED 78   GFRESTBLACK >90     Recent Labs   Lab Test 01/26/20  1448   CR 0.77             Passed - Patient does NOT have a diagnosis of CHF.        Passed - Medication is active on med list        Passed - Patient is not pregnant        Passed - Patient has not had a positive pregnancy test within the past 12 mos.          Medication is " being filled for 1 time refill only due to:  Patient needs labs A1C.   Loida Barraza RN

## 2020-06-13 ENCOUNTER — NURSE TRIAGE (OUTPATIENT)
Dept: NURSING | Facility: CLINIC | Age: 70
End: 2020-06-13

## 2020-06-13 NOTE — TELEPHONE ENCOUNTER
"\"I made an appt with my doctor for next Wed. 6/17 to talk about my blood sugar and meds. I am taking   empagliflozin (JARDIANCE) 10 MG TABS tablet  90 tablet  3  11/13/2019   No    Sig - Route: Take 1 tablet (10 mg) by mouth daily - Oral     And I read where it can cause bumps in the vaginal area and I have those, at times can be very sore. Also my blood sugars are always up and down. This morning it was 314. I sometimes get a little dizzy no other sx. It will usually come down in the evening.\" Patient states she checks blood sugar one or twice daily. Denies other sx. Triaged , gave home care advice and to call back if needed.  Rylie Horn RN [facility]    COVID 19 Nurse Triage Plan/Patient Instructions    Please be aware that novel coronavirus (COVID-19) may be circulating in the community. If you develop symptoms such as fever, cough, or SOB or if you have concerns about the presence of another infection including coronavirus (COVID-19), please contact your health care provider or visit [email or web address].     Disposition/Instructions    Additional COVID19 information to add for patients.   How can I protect others?  If you have symptoms (fever, cough, body aches or trouble breathing): Stay home and away from others (self-isolate) until:    At least 10 days have passed since your symptoms started. And     You ve had no fever--and no medicine that reduces fever--for 3 full days (72 hours). And      Your other symptoms have resolved (gotten better).     If you don t have symptoms, but a test showed that you have COVID-19 (you tested positive):    Stay home and away from others (self-isolate) until at least 10 days have passed since the date of your first positive COVID-19 test.    During this time:    Stay in your own room, even for meals. Use your own bathroom if you can.     Stay away from others in your home. No hugging, kissing or shaking hands. No visitors.    Don t go to work, school or anywhere " else.     Clean  high touch  surfaces often (doorknobs, counters, handles, etc.). Use a household cleaning spray or wipes. You ll find a full list on the EPA website:  www.epa.gov/pesticide-registration/list-n-disinfectants-use-against-sars-cov-2.    Cover your mouth and nose with a mask, tissue or washcloth to avoid spreading germs.    Wash your hands and face often. Use soap and water.    Caregivers in these groups are at risk for severe illness due to COVID-19:  o People 65 years and older  o People who live in a nursing home or long-term care facility  o People with chronic disease (lung, heart, cancer, diabetes, kidney, liver, immunologic)  o People who have a weakened immune system, including those who:  - Are in cancer treatment  - Take medicine that weakens the immune system, such as corticosteroids  - Had a bone marrow or organ transplant  - Have an immune deficiency  - Have poorly controlled HIV or AIDS  - Are obese (body mass index of 40 or higher)  - Smoke regularly    Caregivers should wear gloves while washing dishes, handling laundry and cleaning bedrooms and bathrooms.    Use caution when washing and drying laundry: Don t shake dirty laundry, and use the warmest water setting that you can.    For more tips, go to www.cdc.gov/coronavirus/2019-ncov/downloads/10Things.pdf.    How can I take care of myself?  1. Get lots of rest. Drink extra fluids (unless a doctor has told you not to).     2. Take Tylenol (acetaminophen) for fever or pain. If you have liver or kidney problems, ask your family doctor if it s okay to take Tylenol.     Adults can take either:     650 mg (two 325 mg pills) every 4 to 6 hours, or     1,000 mg (two 500 mg pills) every 8 hours as needed.     Note: Don t take more than 3,000 mg in one day.   Acetaminophen is found in many medicines (both prescribed and over-the-counter medicines). Read all labels to be sure you don t take too much.     For children, check the Tylenol bottle for  the right dose. The dose is based on the child s age or weight.    3. If you have other health problems (like cancer, heart failure, an organ transplant or severe kidney disease): Call your specialty clinic if you don t feel better in the next 2 days.    4. Know when to call 911: Emergency warning signs include:    Trouble breathing or shortness of breath    Pain or pressure in the chest that doesn t go away    Feeling confused like you haven t felt before, or not being able to wake up    Bluish-colored lips or face    What are the symptoms of COVID-19?     The most common symptoms are cough, fever and trouble breathing.     Less common symptoms include body aches, chills, diarrhea (loose, watery poops), fatigue (feeling very tired), headache, runny nose, sore throat and loss of smell.    COVID-19 can cause severe coughing (bronchitis) and lung infection (pneumonia).    How does it spread?     The virus may spread when a person coughs or sneezes into the air. The virus can travel about 6 feet this way, and it can live on surfaces.      Common  (household disinfectants) will kill the virus.    Who is at risk?  Anyone can catch COVID-19 if they re around someone who has the virus.    How can others protect themselves?     Stay away from people who have COVID-19 (or symptoms of COVID-19).    Wash hands often with soap and water. Or, use hand  with at least 60% alcohol.    Avoid touching the eyes, nose or mouth.     Wear a face mask when you go out in public, when sick or when caring for a sick person.    Where can I get more information?    [facility]: About COVID-19: [email or web address]    CDC: What to Do If You re Sick: www.cdc.gov/coronavirus/2019-ncov/about/steps-when-sick.html    CDC: Ending Home Isolation: www.cdc.gov/coronavirus/2019-ncov/hcp/disposition-in-home-patients.html     CDC: Caring for Someone: www.cdc.gov/coronavirus/2019-ncov/if-you-are-sick/care-for-someone.html      [facility]: Interim Guidance for Hospital Discharge to Home: www.[facility]./diseases/coronavirus/hcp/hospdischarge.pdf    [facility] clinical trials (COVID-19 research studies): [email or web address]/n-clinical-trials     Below are the COVID-19 hotlines at the [place] [facility] ([facility]). Interpreters are available.   o For health questions: Call 781-216-0183 or 1-438.774.3677 (7 a.m. to 7 p.m.)  o For questions about schools and childcare: Call 932-076-2227 or 1-980.364.8139 (7 a.m. to 7 p.m.)          Thank you for taking steps to prevent the spread of this virus.  o Limit your contact with others.  o Wear a simple mask to cover your cough.  o Wash your hands well and often.    Resources    [facility]: About COVID-19: [email or web address]    CDC: What to Do If You're Sick: www.cdc.gov/coronavirus/2019-ncov/about/steps-when-sick.html    CDC: Ending Home Isolation: www.cdc.gov/coronavirus/2019-ncov/hcp/disposition-in-home-patients.html     CDC: Caring for Someone: www.cdc.gov/coronavirus/2019-ncov/if-you-are-sick/care-for-someone.html     [facility]: Interim Guidance for Hospital Discharge to Home: www.[facility]./diseases/coronavirus/hcp/hospdischarge.pdf    [facility] clinical trials (COVID-19 research studies): [email or web address]     Below are the COVID-19 hotlines at the [place] [facility] ([facility]). Interpreters are available.   o For health questions: Call 236-050-9365 or 1-383.233.7217 (7 a.m. to 7 p.m.)  o For questions about schools and childcare: Call 611-223-6999 or 1-828.563.7517 (7 a.m. to 7 p.m.)

## 2020-06-17 ENCOUNTER — OFFICE VISIT (OUTPATIENT)
Dept: INTERNAL MEDICINE | Facility: CLINIC | Age: 70
End: 2020-06-17
Payer: MEDICARE

## 2020-06-17 VITALS
OXYGEN SATURATION: 97 % | BODY MASS INDEX: 26.39 KG/M2 | WEIGHT: 149 LBS | RESPIRATION RATE: 16 BRPM | SYSTOLIC BLOOD PRESSURE: 128 MMHG | HEART RATE: 76 BPM | DIASTOLIC BLOOD PRESSURE: 80 MMHG | TEMPERATURE: 98.1 F

## 2020-06-17 DIAGNOSIS — I10 HYPERTENSION GOAL BP (BLOOD PRESSURE) < 140/90: ICD-10-CM

## 2020-06-17 DIAGNOSIS — R41.3 MEMORY LOSS: ICD-10-CM

## 2020-06-17 DIAGNOSIS — R77.8 ABNORMAL SPEP: ICD-10-CM

## 2020-06-17 DIAGNOSIS — E11.9 TYPE 2 DIABETES MELLITUS WITHOUT COMPLICATION, WITHOUT LONG-TERM CURRENT USE OF INSULIN (H): Primary | ICD-10-CM

## 2020-06-17 DIAGNOSIS — L65.9 HAIR LOSS: ICD-10-CM

## 2020-06-17 DIAGNOSIS — N89.8 VAGINAL IRRITATION: ICD-10-CM

## 2020-06-17 LAB
ALBUMIN SERPL-MCNC: 3.9 G/DL (ref 3.4–5)
ALP SERPL-CCNC: 62 U/L (ref 40–150)
ALT SERPL W P-5'-P-CCNC: 24 U/L (ref 0–50)
ANION GAP SERPL CALCULATED.3IONS-SCNC: 5 MMOL/L (ref 3–14)
AST SERPL W P-5'-P-CCNC: 19 U/L (ref 0–45)
BILIRUB SERPL-MCNC: 0.5 MG/DL (ref 0.2–1.3)
BUN SERPL-MCNC: 22 MG/DL (ref 7–30)
CALCIUM SERPL-MCNC: 9.1 MG/DL (ref 8.5–10.1)
CHLORIDE SERPL-SCNC: 104 MMOL/L (ref 94–109)
CHOLEST SERPL-MCNC: 179 MG/DL
CO2 SERPL-SCNC: 29 MMOL/L (ref 20–32)
CREAT SERPL-MCNC: 0.82 MG/DL (ref 0.52–1.04)
CREAT UR-MCNC: 48 MG/DL
GFR SERPL CREATININE-BSD FRML MDRD: 72 ML/MIN/{1.73_M2}
GLUCOSE SERPL-MCNC: 161 MG/DL (ref 70–99)
HBA1C MFR BLD: 7.1 % (ref 0–5.6)
HDLC SERPL-MCNC: 65 MG/DL
LDLC SERPL CALC-MCNC: 87 MG/DL
MICROALBUMIN UR-MCNC: <5 MG/L
MICROALBUMIN/CREAT UR: NORMAL MG/G CR (ref 0–25)
NONHDLC SERPL-MCNC: 114 MG/DL
POTASSIUM SERPL-SCNC: 3.7 MMOL/L (ref 3.4–5.3)
PROT SERPL-MCNC: 7.6 G/DL (ref 6.8–8.8)
SODIUM SERPL-SCNC: 138 MMOL/L (ref 133–144)
TRIGL SERPL-MCNC: 133 MG/DL
TSH SERPL DL<=0.005 MIU/L-ACNC: 0.74 MU/L (ref 0.4–4)
VIT B12 SERPL-MCNC: 400 PG/ML (ref 193–986)

## 2020-06-17 PROCEDURE — 84443 ASSAY THYROID STIM HORMONE: CPT | Performed by: INTERNAL MEDICINE

## 2020-06-17 PROCEDURE — 80053 COMPREHEN METABOLIC PANEL: CPT | Performed by: INTERNAL MEDICINE

## 2020-06-17 PROCEDURE — 83036 HEMOGLOBIN GLYCOSYLATED A1C: CPT | Performed by: INTERNAL MEDICINE

## 2020-06-17 PROCEDURE — 80061 LIPID PANEL: CPT | Performed by: INTERNAL MEDICINE

## 2020-06-17 PROCEDURE — 00000402 ZZHCL STATISTIC TOTAL PROTEIN: Performed by: INTERNAL MEDICINE

## 2020-06-17 PROCEDURE — 82607 VITAMIN B-12: CPT | Performed by: INTERNAL MEDICINE

## 2020-06-17 PROCEDURE — 84165 PROTEIN E-PHORESIS SERUM: CPT | Performed by: INTERNAL MEDICINE

## 2020-06-17 PROCEDURE — 36415 COLL VENOUS BLD VENIPUNCTURE: CPT | Performed by: INTERNAL MEDICINE

## 2020-06-17 PROCEDURE — 99214 OFFICE O/P EST MOD 30 MIN: CPT | Performed by: INTERNAL MEDICINE

## 2020-06-17 PROCEDURE — 82043 UR ALBUMIN QUANTITATIVE: CPT | Performed by: INTERNAL MEDICINE

## 2020-06-17 RX ORDER — GLIPIZIDE 10 MG/1
10 TABLET, FILM COATED, EXTENDED RELEASE ORAL 2 TIMES DAILY
Qty: 180 TABLET | Refills: 3 | Status: SHIPPED | OUTPATIENT
Start: 2020-06-17 | End: 2021-06-09

## 2020-06-17 RX ORDER — HYDROCHLOROTHIAZIDE 25 MG/1
TABLET ORAL
Qty: 90 TABLET | Refills: 3 | Status: SHIPPED | OUTPATIENT
Start: 2020-06-17 | End: 2021-06-09

## 2020-06-17 ASSESSMENT — PAIN SCALES - GENERAL: PAINLEVEL: NO PAIN (0)

## 2020-06-17 NOTE — PROGRESS NOTES
Subjective     Rylie Young is a 70 year old female who presents to clinic today for the following health issues:    HPI   Chief Complaint   Patient presents with     Diabetes     discuss some elevated blood sugars - discuss Jardiance     Checking sugars and getting variable numbers 100 to 250 range, usually in the 130-170 range.      Some hair loss recently.    Other stresses as well.  Worries about memory loss.      Weight is ok.  Not exercising much.        Patient Active Problem List   Diagnosis     Edema     HYPERLIPIDEMIA LDL GOAL <100     Hypertension goal BP (blood pressure) < 140/90     Advanced directives, counseling/discussion     Type 2 diabetes mellitus without complication (H)     Past Surgical History:   Procedure Laterality Date     COLONOSCOPY  10/08/2007    Internal hemorrhoids. Diverticulosis.     COLONOSCOPY N/A 4/3/2019    Procedure: COLONOSCOPY;  Surgeon: Rafael Parsons DO;  Location:  GI     TUBAL LIGATION         Social History     Tobacco Use     Smoking status: Never Smoker     Smokeless tobacco: Never Used   Substance Use Topics     Alcohol use: No     Family History   Problem Relation Age of Onset     C.A.D. Mother          at 40 from CHF     Respiratory Mother         TB     C.A.D. Father          at 57 from MI     C.A.D. Brother         CABG     Cerebrovascular Disease Brother      Cerebrovascular Disease Daughter      Breast Cancer Maternal Aunt      Neurologic Disorder Maternal Aunt         brain tumor     Heart Disease Brother 55        MI         Current Outpatient Medications   Medication Sig Dispense Refill     aspirin 81 MG tablet Take 81 mg by mouth daily  30 tablet      aspirin-acetaminophen-caffeine (EXCEDRIN EXTRA STRENGTH) 250-250-65 MG per tablet Take 1 tablet by mouth as needed.       atenolol (TENORMIN) 50 MG tablet Take 1 tablet (50 mg) by mouth daily 90 tablet 3     blood glucose (ACCU-CHEK CHUCHO PLUS) test strip USE AS DIRECTED TO TEST BLOOD  SUGARS TWO TIMES A DAY OR AS DIRECTED 100 each 5     blood glucose monitoring (ACCU-CHEK CHUCHO PLUS) meter device kit Use to test blood sugars two times daily or as directed. 1 kit 0     blood glucose monitoring (SOFTCLIX) lancets Use to test blood sugar two times daily or as directed. 2 Box 2     empagliflozin (JARDIANCE) 10 MG TABS tablet Take 1 tablet (10 mg) by mouth daily 90 tablet 3     glipiZIDE (GLUCOTROL XL) 10 MG 24 hr tablet Take 1 tablet by mouth twice daily 60 tablet 0     hydrochlorothiazide (HYDRODIURIL) 25 MG tablet TAKE 1 TABLET BY MOUTH IN THE MORNING 90 tablet 0     meclizine (ANTIVERT) 25 MG tablet Take 1 tablet (25 mg) by mouth every 6 hours as needed for dizziness 15 tablet 1     metFORMIN (GLUCOPHAGE) 500 MG tablet TAKE 2 TABLETS BY MOUTH TWICE DAILY WITH MEALS 120 tablet 0     MULTIVITAMINS OR TABS Take 1 tablet by mouth daily  100 1 YEAR     simvastatin (ZOCOR) 40 MG tablet Take 1 tablet by mouth once daily 30 tablet 0     No Known Allergies      Reviewed and updated as needed this visit by Provider         Review of Systems   CONSTITUTIONAL: NEGATIVE for fever, chills, change in weight  INTEGUMENTARY/SKIN: hairloss  EYES: NEGATIVE for vision changes or irritation  ENT/MOUTH: NEGATIVE for ear, mouth and throat problems  RESP: NEGATIVE for significant cough or SOB  BREAST: NEGATIVE for masses, tenderness or discharge  CV: NEGATIVE for chest pain, palpitations or peripheral edema  GI: NEGATIVE for nausea, abdominal pain, heartburn, or change in bowel habits   female: vaginal irritation, bumps she reports   MUSCULOSKELETAL: NEGATIVE for significant arthralgias or myalgia  NEURO: memory loss   ENDOCRINE: NEGATIVE for temperature intolerance, skin/hair changes  HEME: NEGATIVE for bleeding problems  PSYCHIATRIC: NEGATIVE for changes in mood or affect      Objective    /80   Pulse 76   Temp 98.1  F (36.7  C)   Resp 16   Wt 67.6 kg (149 lb)   SpO2 97%   BMI 26.39 kg/m    Body mass  index is 26.39 kg/m .  Physical Exam   GENERAL: healthy, alert and no distress  NECK: no adenopathy, no asymmetry, masses, or scars and thyroid normal to palpation  RESP: lungs clear to auscultation - no rales, rhonchi or wheezes  CV: regular rate and rhythm, normal S1 S2, no S3 or S4, no murmur, click or rub, no peripheral edema and peripheral pulses strong  MS: no gross musculoskeletal defects noted, no edema  SKIN: no hair loss patches that I can see,   PSYCH: mentation appears normal and anxious    Diagnostic Test Results:  Labs reviewed in Epic        Assessment & Plan       ICD-10-CM    1. Type 2 diabetes mellitus without complication, without long-term current use of insulin (H)  E11.9 empagliflozin (JARDIANCE) 25 MG TABS tablet     Hemoglobin A1c     Comprehensive metabolic panel     TSH with free T4 reflex     metFORMIN (GLUCOPHAGE) 500 MG tablet     glipiZIDE (GLUCOTROL XL) 10 MG 24 hr tablet     Lipid Profile     Albumin Random Urine Quantitative with Creat Ratio   2. Hypertension goal BP (blood pressure) < 140/90  I10 Comprehensive metabolic panel     hydrochlorothiazide (HYDRODIURIL) 25 MG tablet     Lipid Profile     Albumin Random Urine Quantitative with Creat Ratio   3. Memory loss  R41.3 TSH with free T4 reflex     Vitamin B12     Protein electrophoresis   4. Vaginal irritation  N89.8    5. Hair loss  L65.9      Patient who has diabetes which got better with Jardiance.  She is been on glipizide and metformin.  She is pretty anxious today.  I think the isolation is made her worse.  She is nervous about what she should do.  We discussed the Jardiance and then at the end she bumps in vaginal irritation.  I did plan to increase her Jardiance from 10 to 25 mg we will check her A1c first.  With the vaginal irritation and maybe burning I offered to do an exam.  I think she would rather have a female do the exam therefore I did refer her to OB/GYN she may just have some atrophic vaginitis.  Or this could be  a side effect of the Jardiance.    She reports some memory loss does not want to do neuropsych testing yet but will start off with lab testing for B12, thyroid, SPEP.    She has had some hair loss however I cannot see patches of hair loss just thinning.  I think this is related to stress and I told her this.  But we will check thyroid and other labs.    I would like to see her back in 3 weeks when I am back in the clinic to follow-up on the GYN appointment, diabetes, and her mental health.        CONSULTATION/REFERRAL to gyn      No follow-ups on file.    Rene Sharma MD  [facility]

## 2020-06-18 ENCOUNTER — OFFICE VISIT (OUTPATIENT)
Dept: OBGYN | Facility: CLINIC | Age: 70
End: 2020-06-18
Payer: MEDICARE

## 2020-06-18 VITALS
DIASTOLIC BLOOD PRESSURE: 70 MMHG | HEART RATE: 72 BPM | SYSTOLIC BLOOD PRESSURE: 124 MMHG | BODY MASS INDEX: 26.77 KG/M2 | WEIGHT: 151.1 LBS | TEMPERATURE: 97.3 F

## 2020-06-18 DIAGNOSIS — N95.2 VAGINAL ATROPHY: ICD-10-CM

## 2020-06-18 DIAGNOSIS — N90.89 VULVAR IRRITATION: Primary | ICD-10-CM

## 2020-06-18 LAB
ALBUMIN SERPL ELPH-MCNC: 4.2 G/DL (ref 3.7–5.1)
ALPHA1 GLOB SERPL ELPH-MCNC: 0.2 G/DL (ref 0.2–0.4)
ALPHA2 GLOB SERPL ELPH-MCNC: 0.8 G/DL (ref 0.5–0.9)
B-GLOBULIN SERPL ELPH-MCNC: 0.8 G/DL (ref 0.6–1)
GAMMA GLOB SERPL ELPH-MCNC: 1 G/DL (ref 0.7–1.6)
M PROTEIN SERPL ELPH-MCNC: 0.1 G/DL
PROT PATTERN SERPL ELPH-IMP: ABNORMAL

## 2020-06-18 PROCEDURE — 99203 OFFICE O/P NEW LOW 30 MIN: CPT | Performed by: OBSTETRICS & GYNECOLOGY

## 2020-06-18 RX ORDER — CLOBETASOL PROPIONATE 0.5 MG/G
OINTMENT TOPICAL AT BEDTIME
Qty: 30 G | Refills: 0 | Status: SHIPPED | OUTPATIENT
Start: 2020-06-18 | End: 2020-11-03

## 2020-06-18 NOTE — PATIENT INSTRUCTIONS
Healthy vulval hygiene practices    Avoid  Substitute    Pantyhose  Stockings with a garter belt    Thigh-high or knee-high stockings   Synthetic underwear Cotton underwear or no underwear   Jeans and other tight pants Loose pants, skirts, dresses   Swimsuits, leotards, thongs, lycra garments Loose-fitting cotton garments   Panty liners Tampons or cotton pads   Scented soaps or shampoos Fragrance-free pH neutral soap (eg, Neutrogena fragrance free, pure olive oil soap, Cetaphil gentle skin cleanser or equivalent ingredients)   Bubble bath Tub baths in the morning and at night without additives and at a comfortable temperature   Scented detergents Unscented detergents   Washcloths Use fingertips for washing; pat dry, don't rub dry   Baby wipes or flushable wipes Rinse with water using sports water bottle or perineal irrigation bottle    Feminine sprays, douches, powders These are not necessary products and can be omitted from personal practices   Dyed toilet articles Toilet articles without dyes   Hair dryers to dry vulva skin without contact Dry vulva by gentle patting       Patient Education     Taking a Sitz Bath  A sitz bath is a type of therapy done by sitting in warm, shallow water. It can help soothe pain, itching, and other symptoms in the anal and genital areas. It can also help keep these areas clean if you can t take a bath or shower. Sitz is from the [place] word sitzen, which means to sit.  Why a sitz bath is done  A sitz bath helps clean and treat certain problems in the anal area, genital area, and the perineum. The perineum is the area between the anus and the vulva in women. In men, it is between the anus and the scrotum. A sitz bath helps increase blood flow to these areas and relax the muscles.  A sitz bath may be done to:    Help ease pain and itching from hemorrhoids    Help ease pain from an anal fissure    Bathe and soothe the perineum after childbirth    Clean and soothe the anal area or  perineum after surgery    Ease prostate pain during prostatitis or after a procedure    Help ease period cramps    Clean the anal and genital areas if you can t take a bath or shower  How a sitz bath is done  A sitz bath can be done in 2 ways: in a bathtub or using a sitz bath bowl.  In a bathtub  To take a sitz bath in a tub:    Make sure your bathtub is clean. Fill a clean bathtub with 3 to 4 inches of warm water. In some cases, your healthcare provider may tell you to use cold water instead.    Add salt or medicine to the water if advised by your healthcare provider.    Gently lower yourself down into the bathtub and sit on the bottom of the tub. Don t get into the bath unless the water temperature is comfortable.    Hold on to a railing. Or ask for help from a family member, friend, or caregiver if needed.  If you have a wound, the water may cause pain at first. But the pain should ease. Make sure the area that needs treating is under the water. You can bend your knees up to help expose the area that needs contact with the water.  Using a sitz bath bowl  A sitz bath bowl is a special plastic container that s placed on a toilet. You can buy this in many drugstores and medical supply stores. To take a sitz bath this way:    Lift the toilet lid and seat. Place a cleaned plastic sitz bath bowl on the rim of your toilet. Make sure the bowl is firmly in place and won t move around.    Fill the sitz bath bowl with warm water from a pitcher or other container. The water should cover your perineum. Make sure the water temperature is comfortable.    Add salt or medicine to the water if advised by your healthcare provider. Or follow the package instructions about how to fill the bowl. Some kits come with a plastic bag and tubing. This lets you stream water into the bowl and at the area of your body that needs treatment. The bowl may have a slot or hole in the back. This lets water flow out so it doesn t overflow onto the  floor. If there is no hole, be careful not to fill the bowl too full.    Gently sit down on the sitz bath bowl. Hold on to a railing. Or ask for help from a family member, friend, or caregiver if needed.  If you have a wound, the water may cause pain at first, but the pain should ease. Make sure the area that needs treating is under the water.  For any type of sitz bath:  1. Sit in the water for 10 to 20 minutes.  2. Add more warm water as needed to keep the water comfortable.  3. Get up slowly from the tub or toilet. You may feel lightheaded or dizzy. Hold on to a railing. Or ask for help from a family member, friend, or caregiver if needed.  4. Gently pat your anal area, perineum, and genitals dry with a clean towel. Don t rub the area.  5. Wash your hands. Put any ointment or cream on the area, if advised.  6. Wash the bathtub or sitz bath bowl with soap and water after each use.  7. Use a sitz bath 2 to 3 times a day, or as often as your healthcare provider advises.  When to call your healthcare provider  Call your healthcare provider right away if you have any of these:    Fever of 100.4 F (38 C) or higher, or as directed    Redness, swelling, or fluid leaking from a cut (incision) that gets worse    Pain that gets worse    Symptoms that don t get better, or get worse    New symptoms  Date Last Reviewed: 5/1/2016 2000-2019 [employer]. [address] [place] [address], [place] [address]. All rights reserved. This information is not intended as a substitute for professional medical care. Always follow your healthcare professional's instructions.

## 2020-06-18 NOTE — NURSING NOTE
"Chief Complaint   Patient presents with     Vaginal Problem       Initial /70 (BP Location: Right arm, Patient Position: Chair, Cuff Size: Adult Regular)   Pulse 72   Temp 97.3  F (36.3  C) (Temporal)   Wt 68.5 kg (151 lb 1.6 oz)   BMI 26.77 kg/m   Estimated body mass index is 26.77 kg/m  as calculated from the following:    Height as of 1/21/20: 1.6 m (5' 3\").    Weight as of this encounter: 68.5 kg (151 lb 1.6 oz).  BP completed using cuff size: regular    No obstetric history on file.    The following HM Due: mammogram      The following patient reported/Care Every where data was sent to:  P ABSTRACT QUALITY INITIATIVES [[id number]]       Carla Kaufman [facility]  June 18, 2020           "

## 2020-06-18 NOTE — PROGRESS NOTES
"  SUBJECTIVE:       HPI: Rylie Young is a 70 year old  who presents today for  Consultation for vulvar irritation, referred by her Primary care provider Rene Sharma.     Patient recently seen for management of diabetes and noted history of vulvar irritation. Exam deferred in the office at that time, patient preferring a female provider for the exam. DDx included vulvovaginal atrophy, reaction to Jardiance.     Patient states history of irregular \"lumps\" 2-3 at a time, on the inside of the labia/opening of vagina, that are occasionally irritated. In the beginning of our discussion, she stated no curent lesions, but later on stated there is 1. No hx HSV, vaginal cysts, other abnormalities.     Vulvar and vaginal hygeine practices:  -Washes with dove soap, likely scented  -Does not use feminine sprays/cleaning products  -Does not shaves/waxes/no pubic hair practices  -Does not douche  -Rare urinary incontinence  -Sexually active with no partners. Does not use toys, lubricant    She denies vaginal discharge, postmenopausal bleeding, dyspareunia. She denies fevers/chills, nausea/vomiting, abdominal pain or bloating. Denies dysuria, hematuria, constipation or diarrhea.    Medical history significant for well controlled type 2 diabetes, hypertension.    Ob Hx:  s/p SVDx3.      Gyn Hx: No LMP recorded. Patient is postmenopausal.     Last pap was  NIL, No history of abnormal paps. Next pap due NA >64yo   STI history denies  Last Mammogram 2017 birads 1  Colon Screening 4/3/19, +diverticulosis  DEXA Screening - none found           reports that she has never smoked. She has never used smokeless tobacco.      Today's PHQ-2 Score:   PHQ-2 (  [employer]) 10/9/2019   Q1: Little interest or pleasure in doing things 0   Q2: Feeling down, depressed or hopeless 0   PHQ-2 Score 0     Today's PHQ-9 Score: No flowsheet data found.  Today's DEANGELO-7 Score: No flowsheet data found.    Problem list and histories " reviewed & adjusted, as indicated.  Additional history: as documented.    Patient Active Problem List   Diagnosis     Edema     HYPERLIPIDEMIA LDL GOAL <100     Hypertension goal BP (blood pressure) < 140/90     Advanced directives, counseling/discussion     Type 2 diabetes mellitus without complication (H)     Past Surgical History:   Procedure Laterality Date     COLONOSCOPY  10/08/2007    Internal hemorrhoids. Diverticulosis.     COLONOSCOPY N/A 4/3/2019    Procedure: COLONOSCOPY;  Surgeon: Rafael Parsons DO;  Location: [facility]     TUBAL LIGATION        Social History     Tobacco Use     Smoking status: Never Smoker     Smokeless tobacco: Never Used   Substance Use Topics     Alcohol use: No      Problem (# of Occurrences) Relation (Name,Age of Onset)    Breast Cancer (1) Maternal Aunt    C.A.D. (3) Mother:  at 40 from CHF, Father:  at 57 from MI, Brother: CABG    Cerebrovascular Disease (2) Brother, Daughter    Heart Disease (1) Brother (55): MI    Neurologic Disorder (1) Maternal Aunt: brain tumor    Respiratory (1) Mother: TB            aspirin 81 MG tablet, Take 81 mg by mouth daily   aspirin-acetaminophen-caffeine (EXCEDRIN EXTRA STRENGTH) 250-250-65 MG per tablet, Take 1 tablet by mouth as needed.  atenolol (TENORMIN) 50 MG tablet, Take 1 tablet (50 mg) by mouth daily  blood glucose (ACCU-CHEK CHUCHO PLUS) test strip, USE AS DIRECTED TO TEST BLOOD SUGARS TWO TIMES A DAY OR AS DIRECTED  blood glucose monitoring (ACCU-CHEK CHUCHO PLUS) meter device kit, Use to test blood sugars two times daily or as directed.  blood glucose monitoring (SOFTCLIX) lancets, Use to test blood sugar two times daily or as directed.  empagliflozin (JARDIANCE) 25 MG TABS tablet, Take 1 tablet (25 mg) by mouth daily  glipiZIDE (GLUCOTROL XL) 10 MG 24 hr tablet, Take 1 tablet (10 mg) by mouth 2 times daily  hydrochlorothiazide (HYDRODIURIL) 25 MG tablet, TAKE 1 TABLET BY MOUTH IN THE MORNING  meclizine (ANTIVERT) 25 MG  tablet, Take 1 tablet (25 mg) by mouth every 6 hours as needed for dizziness  metFORMIN (GLUCOPHAGE) 500 MG tablet, TAKE 2 TABLETS BY MOUTH TWICE DAILY WITH MEALS  MULTIVITAMINS OR TABS, Take 1 tablet by mouth daily   simvastatin (ZOCOR) 40 MG tablet, Take 1 tablet by mouth once daily    No current facility-administered medications on file prior to visit.     No Known Allergies    ROS:  10 Point review of systems negative other noted above in HPI    OBJECTIVE:     /70 (BP Location: Right arm, Patient Position: Chair, Cuff Size: Adult Regular)   Pulse 72   Temp 97.3  F (36.3  C) (Temporal)   Wt 68.5 kg (151 lb 1.6 oz)   BMI 26.77 kg/m    Body mass index is 26.77 kg/m .      Gen: Alert, oriented, appropriately interactive, NAD  Chest: Symmetrical, unlabored breathing  Abdomen: soft, non tender, non distended, no masses, no hernias. No inguinal lymphadenopathy.   External genitalia: no lesions; normal appearing external genitalia, bartholins glands, urethra, skenes glands  Vagina: no masses or lesions or discharge, atrophic  Cervix: no masses or lesions or discharge   Bimanual exam:   Nontender pelvic floor muscles  Urethra: nontender   Bladder: nontender and without massess, well supported   Uterus: midline, anteverted, small, mobile  no masses, non-tender  Adnexa: no masses or tenderness appreciated   No cervical motion tenderness  MSK: normal gait, symmetric movements UE & LE   Lower extremities: non-tender, no edema    In-Clinic Test Results:  No results found for this or any previous visit (from the past 24 hour(s)).    ASSESSMENT/PLAN:                                                      Rylie Young is a 70 year old  who presents today for vulvar irritation      ICD-10-CM    1. Vulvar irritation  N90.89 clobetasol (TEMOVATE) 0.05 % external ointment   2. Vaginal atrophy  N95.2      Chronic vulvar irritation, possibly secondary to hygiene practices. No concerning findings today on exam.  "Some confusion during the visit with the patient regarding symptoms, she changed her complaints a few times, at one point stating that she did not currently have any \"lumps\" and at another convinced she had one inside the vagina. No abnormalities visualized or palpated. Vulvar and vaginal tissue atrophic, minimal erythema.     Discussed vulvar and vaginal hygiene practices, and list of practices given to patient. Advised to avoid Over the counter \"feminine\" soaps/sprays. Instead, recommend Cetaphil cleanser or other gentle ph-neutral cleanser. No need to use any products inside of the vagina. Rx for clobetasol given to help with irritation. Also discussed option for vaginal estrogen cream, which she declined today. Recommended daily sitz baths as needed as well for irritation. If irritation continues or worsens despite hygiene practices and cream, recommend vulvar biopsy at that time. Also discussed that if her \"lumps\" return or worsen, she should return for another exam.      Nina Oconnor, DO  [facility]  "

## 2020-06-19 ENCOUNTER — TELEPHONE (OUTPATIENT)
Dept: INTERNAL MEDICINE | Facility: CLINIC | Age: 70
End: 2020-06-19

## 2020-06-19 NOTE — TELEPHONE ENCOUNTER
----- Message from Rene Sharma MD sent at 6/19/2020  1:11 PM CDT -----  Please let her know that her protein level was slightly off would like to evaluate this further with a serum and urine immunofixation.  I put those orders into the lab and she can come back and do those when she schedules a lab visit.

## 2020-06-19 NOTE — TELEPHONE ENCOUNTER
Spoke with patient and informed of results below. Patient understood. No further questions or concerns at this time. Lab appointment made for 6/24/2020    Janet Dasilva MA

## 2020-06-24 DIAGNOSIS — R77.8 ABNORMAL SPEP: ICD-10-CM

## 2020-06-24 PROCEDURE — 36415 COLL VENOUS BLD VENIPUNCTURE: CPT | Performed by: INTERNAL MEDICINE

## 2020-06-24 PROCEDURE — 86335 IMMUNFIX E-PHORSIS/URINE/CSF: CPT | Performed by: INTERNAL MEDICINE

## 2020-06-24 PROCEDURE — 86334 IMMUNOFIX E-PHORESIS SERUM: CPT | Performed by: INTERNAL MEDICINE

## 2020-06-24 PROCEDURE — 82784 ASSAY IGA/IGD/IGG/IGM EACH: CPT | Performed by: INTERNAL MEDICINE

## 2020-06-25 LAB
IGA SERPL-MCNC: 189 MG/DL (ref 84–499)
IGG SERPL-MCNC: 1031 MG/DL (ref 610–1616)
IGM SERPL-MCNC: 32 MG/DL (ref 35–242)
PROT ELPH PNL UR ELPH: NORMAL
PROT PATTERN SERPL IFE-IMP: ABNORMAL

## 2020-06-27 ENCOUNTER — NURSE TRIAGE (OUTPATIENT)
Dept: NURSING | Facility: CLINIC | Age: 70
End: 2020-06-27

## 2020-06-27 NOTE — TELEPHONE ENCOUNTER
Patient calling for results from 6/24.Per [facility] MD has not reviewed yet, advised pt to call clinic on Mon 6/29 . No triage was needed.  Rylie Horn RN [facility]

## 2020-06-29 ENCOUNTER — TELEPHONE (OUTPATIENT)
Dept: INTERNAL MEDICINE | Facility: CLINIC | Age: 70
End: 2020-06-29

## 2020-06-29 NOTE — TELEPHONE ENCOUNTER
Spoke with patient and informed of message below. Patient declined to have visit on Wednesday. She will be at her sisters house helping her build a deck.     Janet Dasilva MA

## 2020-06-29 NOTE — TELEPHONE ENCOUNTER
----- Message from Rene Sharma MD sent at 6/29/2020 12:25 PM CDT -----  Serum protein was slighlty off but very little. Would repeat serum and urine immunofixation in 6 months

## 2020-06-29 NOTE — TELEPHONE ENCOUNTER
"Spoke to patient and relayed the message from below. Patient verbalized understanding but is not willing to come back in for labs in 6 months. She does have concerns since her last conversation with provider regarding her pancreas \"dying\" and what she can do to prevent that. Would you like to set up a telephone visit with her or can you contact her to answer her questions she has. Please advise. Precious Alba CMA ([facility])    "

## 2020-07-08 ENCOUNTER — OFFICE VISIT (OUTPATIENT)
Dept: INTERNAL MEDICINE | Facility: CLINIC | Age: 70
End: 2020-07-08
Payer: MEDICARE

## 2020-07-08 VITALS
WEIGHT: 152 LBS | TEMPERATURE: 98 F | RESPIRATION RATE: 16 BRPM | BODY MASS INDEX: 26.93 KG/M2 | HEART RATE: 72 BPM | SYSTOLIC BLOOD PRESSURE: 142 MMHG | DIASTOLIC BLOOD PRESSURE: 84 MMHG | OXYGEN SATURATION: 100 %

## 2020-07-08 DIAGNOSIS — E11.9 TYPE 2 DIABETES MELLITUS WITHOUT COMPLICATION, WITHOUT LONG-TERM CURRENT USE OF INSULIN (H): Primary | ICD-10-CM

## 2020-07-08 DIAGNOSIS — R77.8 ABNORMAL SPEP: ICD-10-CM

## 2020-07-08 DIAGNOSIS — R41.3 MEMORY LOSS: ICD-10-CM

## 2020-07-08 DIAGNOSIS — F41.9 ANXIETY: ICD-10-CM

## 2020-07-08 PROCEDURE — 99214 OFFICE O/P EST MOD 30 MIN: CPT | Performed by: INTERNAL MEDICINE

## 2020-07-08 RX ORDER — OMEGA-3 FATTY ACIDS/FISH OIL 300-1000MG
200 CAPSULE ORAL EVERY 4 HOURS PRN
COMMUNITY

## 2020-07-08 ASSESSMENT — PAIN SCALES - GENERAL: PAINLEVEL: NO PAIN (0)

## 2020-07-08 NOTE — PROGRESS NOTES
Subjective     Rylie Young is a 70 year old female who presents to clinic today for the following health issues:    HPI   Chief Complaint   Patient presents with     Diabetes     f/u     Anxiety     f/u     Results     discuss recent lab results     Saw gyn and doing ok.      Hgba1c was 7.1 so diabetes is good.  Jardiance is still ok to use.  Sugars do jump up and down.      Small monoclonal protein changes.  Just need to follow in the future.      Anxiety about the news and world, doing better now.      Patient Active Problem List   Diagnosis     Edema     HYPERLIPIDEMIA LDL GOAL <100     Hypertension goal BP (blood pressure) < 140/90     Advanced directives, counseling/discussion     Type 2 diabetes mellitus without complication (H)     Past Surgical History:   Procedure Laterality Date     COLONOSCOPY  10/08/2007    Internal hemorrhoids. Diverticulosis.     COLONOSCOPY N/A 4/3/2019    Procedure: COLONOSCOPY;  Surgeon: Rafael Parsons DO;  Location:  GI     TUBAL LIGATION         Social History     Tobacco Use     Smoking status: Never Smoker     Smokeless tobacco: Never Used   Substance Use Topics     Alcohol use: No     Family History   Problem Relation Age of Onset     C.A.D. Mother          at 40 from CHF     Respiratory Mother         TB     C.A.D. Father          at 57 from MI     C.A.D. Brother         CABG     Cerebrovascular Disease Brother      Cerebrovascular Disease Daughter      Breast Cancer Maternal Aunt      Neurologic Disorder Maternal Aunt         brain tumor     Heart Disease Brother 55        MI         Current Outpatient Medications   Medication Sig Dispense Refill     aspirin 81 MG tablet Take 81 mg by mouth daily  30 tablet      atenolol (TENORMIN) 50 MG tablet Take 1 tablet (50 mg) by mouth daily 90 tablet 3     blood glucose (ACCU-CHEK CHUCHO PLUS) test strip USE AS DIRECTED TO TEST BLOOD SUGARS TWO TIMES A DAY OR AS DIRECTED 100 each 5     blood glucose monitoring  (ACCU-CHEK CHUCHO PLUS) meter device kit Use to test blood sugars two times daily or as directed. 1 kit 0     blood glucose monitoring (SOFTCLIX) lancets Use to test blood sugar two times daily or as directed. 2 Box 2     clobetasol (TEMOVATE) 0.05 % external ointment Apply topically At Bedtime Apply in thin layer to area of burning at night before bed for 2 weeks 30 g 0     empagliflozin (JARDIANCE) 25 MG TABS tablet Take 1 tablet (25 mg) by mouth daily 90 tablet 3     glipiZIDE (GLUCOTROL XL) 10 MG 24 hr tablet Take 1 tablet (10 mg) by mouth 2 times daily 180 tablet 3     hydrochlorothiazide (HYDRODIURIL) 25 MG tablet TAKE 1 TABLET BY MOUTH IN THE MORNING 90 tablet 3     ibuprofen (ADVIL/MOTRIN) 200 MG capsule Take 200 mg by mouth every 4 hours as needed for fever       meclizine (ANTIVERT) 25 MG tablet Take 1 tablet (25 mg) by mouth every 6 hours as needed for dizziness 15 tablet 1     metFORMIN (GLUCOPHAGE) 500 MG tablet TAKE 2 TABLETS BY MOUTH TWICE DAILY WITH MEALS 360 tablet 3     MULTIVITAMINS OR TABS Take 1 tablet by mouth daily  100 1 YEAR     simvastatin (ZOCOR) 40 MG tablet Take 1 tablet by mouth once daily 30 tablet 0     aspirin-acetaminophen-caffeine (EXCEDRIN EXTRA STRENGTH) 250-250-65 MG per tablet Take 1 tablet by mouth as needed.       No Known Allergies      Reviewed and updated as needed this visit by Provider         Review of Systems   CONSTITUTIONAL: NEGATIVE for fever, chills, change in weight  INTEGUMENTARY/SKIN: NEGATIVE for worrisome rashes, moles or lesions  EYES: NEGATIVE for vision changes or irritation  ENT/MOUTH: NEGATIVE for ear, mouth and throat problems  RESP: NEGATIVE for significant cough or SOB  CV: NEGATIVE for chest pain, palpitations or peripheral edema  GI: NEGATIVE for nausea, abdominal pain, heartburn, or change in bowel habits  : NEGATIVE for frequency, dysuria, or hematuria  MUSCULOSKELETAL: NEGATIVE for significant arthralgias or myalgia  NEURO: NEGATIVE for weakness,  dizziness or paresthesias  ENDOCRINE: NEGATIVE for temperature intolerance, skin/hair changes  HEME: NEGATIVE for bleeding problems  PSYCHIATRIC: POSITIVE foranxiety      Objective    BP (!) 142/84   Pulse 72   Temp 98  F (36.7  C) (Temporal)   Resp 16   Wt 68.9 kg (152 lb)   SpO2 100%   BMI 26.93 kg/m    Body mass index is 26.93 kg/m .  Physical Exam   GENERAL: healthy, alert and no distress  RESP: lungs clear to auscultation - no rales, rhonchi or wheezes  CV: regular rate and rhythm, normal S1 S2, no S3 or S4, no murmur, click or rub, no peripheral edema and peripheral pulses strong  MS: no gross musculoskeletal defects noted, no edema  SKIN: no suspicious lesions or rashes  PSYCH: anxious    Diagnostic Test Results:  Labs reviewed in Epic        Assessment & Plan       ICD-10-CM    1. Type 2 diabetes mellitus without complication, without long-term current use of insulin (H)  E11.9 **A1C FUTURE anytime   2. Abnormal SPEP  R77.8 Protein electrophoresis     Protein Immunofixation Serum   3. Memory loss  R41.3    4. Anxiety  F41.9      Diabetes is doing well, A1c was 7.1.  We will continue Jardiance.  No vaginal problems or follows with GYN.  Abnormal SPEP and some memory loss at this point we will repeat her protein immunofixation in 6 months and if worsening then see oncology.  Tried to reassure the patient today.  Continue memory loss but she is functioning okay at home.  Did not want any neuropsych testing done.    Anxiety is quite high related to the news, orders were appropriate virus.  She is isolated near home but trying to set up rules to keep her from going to excessive or problematic.  Did not want any medication        No follow-ups on file.    Rene Sharma MD  [facility]

## 2020-07-15 ENCOUNTER — TELEPHONE (OUTPATIENT)
Dept: PHARMACY | Facility: CLINIC | Age: 70
End: 2020-07-15

## 2020-07-15 NOTE — TELEPHONE ENCOUNTER
This patient is due for MTM follow-up. I called the patient to schedule an appointment and left a message with the clinic phone number for the patient to call to schedule.    New Coffey, MannieD, [facility]  Medication Therapy Management Pharmacist  Pager: [phone]

## 2020-09-04 DIAGNOSIS — E78.5 HYPERLIPIDEMIA LDL GOAL <100: ICD-10-CM

## 2020-09-04 RX ORDER — SIMVASTATIN 40 MG
TABLET ORAL
Qty: 90 TABLET | Refills: 0 | Status: SHIPPED | OUTPATIENT
Start: 2020-09-04 | End: 2020-12-15

## 2020-09-04 NOTE — TELEPHONE ENCOUNTER
"Prescription approved per RN refill protocol.    Zocor  Last Written Prescription Date:  6/10/2020  Last Fill Quantity: 30,  # refills: 0   Last office visit: 7/8/2020 with prescribing provider:  Isidro   Future Office Visit:      Requested Prescriptions   Pending Prescriptions Disp Refills     simvastatin (ZOCOR) 40 MG tablet [Pharmacy Med Name: Simvastatin 40 MG Oral Tablet] 90 tablet 0     Sig: Take 1 tablet by mouth once daily       [facility]ins Protocol Passed - 9/4/2020 12:23 PM        Passed - LDL on file in past 12 months     Recent Labs   Lab Test 06/17/20  1053   LDL 87             Passed - No abnormal creatine kinase in past 12 months     No lab results found.             Passed - Recent (12 mo) or future (30 days) visit within the authorizing provider's specialty     Patient has had an office visit with the authorizing provider or a provider within the authorizing providers department within the previous 12 mos or has a future within next 30 days. See \"Patient Info\" tab in inbasket, or \"Choose Columns\" in Meds & Orders section of the refill encounter.              Passed - Medication is active on med list        Passed - Patient is age 18 or older        Passed - No active pregnancy on record        Passed - No positive pregnancy test in past 12 months           Soraida Fernández RN on 9/4/2020 at 12:28 PM    "

## 2020-09-30 ENCOUNTER — TELEPHONE (OUTPATIENT)
Dept: PHARMACY | Facility: CLINIC | Age: 70
End: 2020-09-30

## 2020-09-30 NOTE — TELEPHONE ENCOUNTER
This patient is due for MTM follow-up. I called the patient to schedule an appointment and left a message with the clinic phone number for the patient to call to schedule.    New Coffey, MannieD, [facility]  Medication Therapy Management Pharmacist  Pager: [phone]

## 2020-11-03 DIAGNOSIS — N90.89 VULVAR IRRITATION: ICD-10-CM

## 2020-11-03 RX ORDER — CLOBETASOL PROPIONATE 0.5 MG/G
OINTMENT TOPICAL AT BEDTIME
Qty: 30 G | Refills: 0 | Status: SHIPPED | OUTPATIENT
Start: 2020-11-03

## 2020-11-03 NOTE — TELEPHONE ENCOUNTER
Requested Prescriptions   Pending Prescriptions Disp Refills     clobetasol (TEMOVATE) 0.05 % external ointment 30 g 0     Sig: Apply topically At Bedtime Apply in thin layer to area of burning at night before bed for 2 weeks       There is no refill protocol information for this order        Pt last seen 6/18/2020 for vulvar irritation.    Last prescribed on 6/18/2020 for 30g with 0 refills.    Routing refill request to provider for review/approval because:  Drug not on the [facility] refill protocol     Alivia Danielle RN on 11/3/2020 at 1:08 PM

## 2020-11-03 NOTE — TELEPHONE ENCOUNTER
Nina Oconnor,   Mg Ob/Gyn Triage Just now (3:56 PM)     Is patient still symptomatic or is she using the ointment daily?     Nina Oconnor, DO    Message text      Pt states she is allergic Jardiance and that this is leading to the vaginal irritation and lumps which pt states she currently has (and has had since last appt on 6/18/2020) and is using clobeatsol sparingly for this but is now out. Pt denies any additional symptoms.    RN routing to provider for advisement.    Alivia Danielle RN on 11/3/2020 at 4:05 PM

## 2020-11-03 NOTE — TELEPHONE ENCOUNTER
Received fax from Pharmacy for Clobetasol 0.05% ointment.    [facility] pharmacy      Carla Kaufman, [facility]  November 3, 2020

## 2021-01-22 ENCOUNTER — VIRTUAL VISIT (OUTPATIENT)
Dept: PHARMACY | Facility: CLINIC | Age: 71
End: 2021-01-22
Payer: COMMERCIAL

## 2021-01-22 DIAGNOSIS — E78.5 HYPERLIPIDEMIA LDL GOAL <100: ICD-10-CM

## 2021-01-22 DIAGNOSIS — E11.9 TYPE 2 DIABETES MELLITUS WITHOUT COMPLICATION, WITHOUT LONG-TERM CURRENT USE OF INSULIN (H): Primary | ICD-10-CM

## 2021-01-22 DIAGNOSIS — I10 HYPERTENSION GOAL BP (BLOOD PRESSURE) < 140/90: ICD-10-CM

## 2021-01-22 PROCEDURE — 99605 MTMS BY PHARM NP 15 MIN: CPT | Mod: TEL | Performed by: PHARMACIST

## 2021-01-22 PROCEDURE — 99607 MTMS BY PHARM ADDL 15 MIN: CPT | Mod: TEL | Performed by: PHARMACIST

## 2021-01-22 NOTE — LETTER
[facility]  [address] [id number] [phone]      January 22, 2021    Rylie Young                                                                                                                     [address]   [place] [address]      Dear Rylie,    Recommendations from today's MTM visit:                                                    MTM (medication therapy management) is a service provided by a clinical pharmacist designed to help you get the most of out of your medicines.   Today we reviewed what your medicines are for, how to know if they are working, that your medicines are safe and how to make your medicine regimen as easy as possible.      1. Based upon our conversation today I believe there are multiple pathways we could take to managing your diabetes. Please review and consider for future visits with Dr. Sharma or myself.    A. Continue with Jardiance (likely at 10 mg tablet strength) - This medication is controlling your blood sugars fairly well, but due to its mechanism of eliminating extra sugar through your urine you have been experiencing side effects including vaginal irritation.  This is also a higher cost medication given it is a brand name.    B. Change to alternative brand name pill - There are oral medications such as Januvia or Tradjenta that are typically well tolerated (nausea, headache - most common) that are also weight neutral.  They do not have as much blood sugar lowering potential as Jardiance type medications, but because the full dose likely would be tolerated it may have similar benefit to the lower doses of Jardiance that you have been using. Still brand name so cost may be similar to Jardiance - potential for generic in next few years.    C. Change to alternate non-insulin injectable (Victoza, Ozempic, Trulicity) - these daily or once weekly injectable medications are much better at lowering blood sugars and do  have some weight loss benefits. They work by allowing your insulin to work longer, reduce appetite, and slow absorption of foods.  They can have digestive side effects such as nausea, bloating, constipation or diarrhea, or abdominal pain.  These are brand name and can be expensive.    D. Change to generic pill - there is one more generic option on the market called pioglitazone (brand name was Actos). These pills help to reduce your resistance to insulin to help store your sugars.  The main side effects of concern are fluid retention and weight gain.  Typically we see these side effects at the higher doses. If we went with this I would suggest a lower 15 mg daily dose to start and re-assessing in 3 months.     It was great to speak with you today.  I value your experience and would be very thankful for your time with providing feedback on our clinic survey. You may receive a survey via email or text message in the next few days.     Next MTM visit: 6 months or sooner if needed    To schedule another MTM appointment, please call the clinic directly or you may call the MTM scheduling line at [phone] or toll-free at [phone].     My Clinical Pharmacist's contact information:                                                      It was a pleasure talking with you today!  Please feel free to contact me with any questions or concerns you have.      New Coffey, Belinda, [facility]  Medication Therapy Management Pharmacist  Pager: [phone]

## 2021-01-22 NOTE — PROGRESS NOTES
Medication Therapy Management (MTM) Encounter    ASSESSMENT:                            Medication Adherence/Access: See below for considerations    Type 2 Diabetes: Patient is close meeting A1c goal of < 7%.  Issues tolerating SGLT2 inhibitor and with cost of brand name.  May benefit from alternatives including pioglitazone (generic, lower dose may limit weight gain concern), DPP4 inhibitor (weight neutral, less side effects, potentially lower cost), or GLP-1 agonist (weight loss, brand name, more effective, different side effect concerns). Given cost concerns central to conversation low dose pioglitazone may be good option to at least attempt with close follow-up.     Hypertension: Stable.    Hyperlipidemia: Stable.      PLAN:                            1. Sent information related to medication alternatives.      Follow-up: 6 months or sooner if needed    SUBJECTIVE/OBJECTIVE:                          Rylie Young is a 70 year old female called for a follow-up visit. She was referred to me from Dr. Sharma.  Today's visit is a follow-up MTM visit from 11/13/2019. Insurance is the same this year.    Reason for visit: Diabetes/Med Review.    Allergies/ADRs: Reviewed in chart  Tobacco: She reports that she has never smoked. She has never used smokeless tobacco.  Alcohol: none  Activity: limited  Past Medical History: Reviewed in chart      Medication Adherence/Access: concerns for cost of medications    Type 2 Diabetes:  Currently taking metformin  mg - two tablets twice daily, glipizide XL 10 mg twice daily, and Jardiance 12.5 mg daily.   Patient has had issues tolerating Jardiance due to vaginal irritation. She previously was only taking a half of a 10 mg tablet daily. 90-day supply lasted her 180 days so she did not realize that Dr. Sharma had increased dose.  Still having irritation.  She is also concerned about the cost of her current prescription since it has been as high as $800 at the start of the  year. Recently spent around $400 in December for a 90 day supply of Jardiance.  She wonders about alternatives both in terms of cost and side effects. Weight gain has been a concern of hers in the past. Patient is experiencing the following side effects: vaginal irritation  Blood sugar monitorin time(s) daily. Ranges (patient reported): 100-180 mg/dL  Symptoms of low blood sugar? none  Symptoms of high blood sugar? none  Eye exam: due  Foot exam: due  Diet/Exercise: Lives in [facility] now and her dog has passed away. As a result much less activity. Still watches her diet, but definitely has hopes for weight loss vs weight gain.  Aspirin: Taking 81mg daily and denies side effects  Statin: Yes: simvastatin   ACEi/ARB: No.   Urine Albumin:   Lab Results   Component Value Date    UMALCR Unable to calculate due to low value 2020      Lab Results   Component Value Date    A1C 7.1 2020    A1C 7.0 10/09/2019    A1C 8.5 2019    A1C 7.8 2017    A1C 7.5 2016       Hypertension: Current medications include atenolol 50 mg daily and hydrochlorothiazide 25 mg daily.  Patient does not self-monitor blood pressure.  Patient reports no current medication side effects.  BP Readings from Last 3 Encounters:   20 (!) 142/84   20 124/70   20 128/80       Hyperlipidemia: Current therapy includes simvastatin 40 mg daily.  Patient reports no significant myalgias or other side effects.  The 10-year ASCVD risk score (Altoonabuddy CALZADA Jr., et al., 2013) is: 32%    Values used to calculate the score:      Age: 70 years      Sex: Female      Is Non- : No      Diabetic: Yes      Tobacco smoker: No      Systolic Blood Pressure: 142 mmHg      Is BP treated: Yes      HDL Cholesterol: 65 mg/dL      Total Cholesterol: 179 mg/dL  Recent Labs   Lab Test 20  1053 17  0925 02/04/15  0956 02/04/15  0956 14  0822   CHOL 179 164   < > 153 134   HDL 65 65   < > 64 56   LDL 87 74    < > 64 65   TRIG 133 124   < > 123 64   CHOLHDLRATIO  --   --   --  2.4 2.0    < > = values in this interval not displayed.       Today's Vitals: There were no vitals taken for this visit.  ----------------        I spent 35 minutes with this patient today. A copy of the visit note was provided to the patient's primary care provider.    The patient was mailed a summary of these recommendations.     New Coffey, MannieD, [facility]  Medication Therapy Management Pharmacist  Pager: [phone]      Telemedicine Visit Details  Type of service:  Telephone visit  Start Time: 10:25 AM  End Time: 11:00 AM  Originating Location (patient location): Home  Distant Location (provider location):  [facility]      Medication Therapy Recommendations  No medication therapy recommendations to display

## 2021-01-22 NOTE — PATIENT INSTRUCTIONS
Recommendations from today's MTM visit:                                                    MTM (medication therapy management) is a service provided by a clinical pharmacist designed to help you get the most of out of your medicines.   Today we reviewed what your medicines are for, how to know if they are working, that your medicines are safe and how to make your medicine regimen as easy as possible.      1. Based upon our conversation today I believe there are multiple pathways we could take to managing your diabetes. Please review and consider for future visits with Dr. Sharma or myself.    A. Continue with Jardiance (likely at 10 mg tablet strength) - This medication is controlling your blood sugars fairly well, but due to its mechanism of eliminating extra sugar through your urine you have been experiencing side effects including vaginal irritation.  This is also a higher cost medication given it is a brand name.    B. Change to alternative brand name pill - There are oral medications such as Januvia or Tradjenta that are typically well tolerated (nausea, headache - most common) that are also weight neutral.  They do not have as much blood sugar lowering potential as Jardiance type medications, but because the full dose likely would be tolerated it may have similar benefit to the lower doses of Jardiance that you have been using. Still brand name so cost may be similar to Jardiance - potential for generic in next few years.    C. Change to alternate non-insulin injectable (Victoza, Ozempic, Trulicity) - these daily or once weekly injectable medications are much better at lowering blood sugars and do have some weight loss benefits. They work by allowing your insulin to work longer, reduce appetite, and slow absorption of foods.  They can have digestive side effects such as nausea, bloating, constipation or diarrhea, or abdominal pain.  These are brand name and can be expensive.    D. Change to generic pill -  there is one more generic option on the market called pioglitazone (brand name was Actos). These pills help to reduce your resistance to insulin to help store your sugars.  The main side effects of concern are fluid retention and weight gain.  Typically we see these side effects at the higher doses. If we went with this I would suggest a lower 15 mg daily dose to start and re-assessing in 3 months.     It was great to speak with you today.  I value your experience and would be very thankful for your time with providing feedback on our clinic survey. You may receive a survey via email or text message in the next few days.     Next MTM visit: 6 months or sooner if needed    To schedule another MTM appointment, please call the clinic directly or you may call the MTM scheduling line at [phone] or toll-free at [phone].     My Clinical Pharmacist's contact information:                                                      It was a pleasure talking with you today!  Please feel free to contact me with any questions or concerns you have.      New Coffey, PharmD, BCACP  Medication Therapy Management Pharmacist  Pager: [phone]

## 2021-03-05 ENCOUNTER — OFFICE VISIT (OUTPATIENT)
Dept: INTERNAL MEDICINE | Facility: CLINIC | Age: 71
End: 2021-03-05
Payer: MEDICARE

## 2021-03-05 ENCOUNTER — TELEPHONE (OUTPATIENT)
Dept: INTERNAL MEDICINE | Facility: CLINIC | Age: 71
End: 2021-03-05

## 2021-03-05 ENCOUNTER — TRANSFERRED RECORDS (OUTPATIENT)
Dept: HEALTH INFORMATION MANAGEMENT | Facility: CLINIC | Age: 71
End: 2021-03-05

## 2021-03-05 VITALS
BODY MASS INDEX: 25.69 KG/M2 | HEIGHT: 63 IN | WEIGHT: 145 LBS | HEART RATE: 74 BPM | OXYGEN SATURATION: 97 % | SYSTOLIC BLOOD PRESSURE: 126 MMHG | RESPIRATION RATE: 16 BRPM | DIASTOLIC BLOOD PRESSURE: 86 MMHG | TEMPERATURE: 98.2 F

## 2021-03-05 DIAGNOSIS — I10 HYPERTENSION GOAL BP (BLOOD PRESSURE) < 140/90: ICD-10-CM

## 2021-03-05 DIAGNOSIS — R77.8 ABNORMAL SPEP: ICD-10-CM

## 2021-03-05 DIAGNOSIS — E11.9 TYPE 2 DIABETES MELLITUS WITHOUT COMPLICATION, WITHOUT LONG-TERM CURRENT USE OF INSULIN (H): Primary | ICD-10-CM

## 2021-03-05 LAB
CHOLEST SERPL-MCNC: 189 MG/DL
HBA1C MFR BLD: 6.9 % (ref 0–5.6)
HDLC SERPL-MCNC: 67 MG/DL
LDLC SERPL CALC-MCNC: 102 MG/DL
NONHDLC SERPL-MCNC: 122 MG/DL
RETINOPATHY: NEGATIVE
TRIGL SERPL-MCNC: 101 MG/DL

## 2021-03-05 PROCEDURE — 99214 OFFICE O/P EST MOD 30 MIN: CPT | Performed by: INTERNAL MEDICINE

## 2021-03-05 PROCEDURE — 99N1036 PR STATISTIC TOTAL PROTEIN: Performed by: INTERNAL MEDICINE

## 2021-03-05 PROCEDURE — 36415 COLL VENOUS BLD VENIPUNCTURE: CPT | Performed by: INTERNAL MEDICINE

## 2021-03-05 PROCEDURE — 82784 ASSAY IGA/IGD/IGG/IGM EACH: CPT | Performed by: INTERNAL MEDICINE

## 2021-03-05 PROCEDURE — 80061 LIPID PANEL: CPT | Performed by: INTERNAL MEDICINE

## 2021-03-05 PROCEDURE — 83036 HEMOGLOBIN GLYCOSYLATED A1C: CPT | Performed by: INTERNAL MEDICINE

## 2021-03-05 PROCEDURE — 86334 IMMUNOFIX E-PHORESIS SERUM: CPT | Performed by: PATHOLOGY

## 2021-03-05 PROCEDURE — 84165 PROTEIN E-PHORESIS SERUM: CPT | Performed by: PATHOLOGY

## 2021-03-05 RX ORDER — DULAGLUTIDE 0.75 MG/.5ML
0.75 INJECTION, SOLUTION SUBCUTANEOUS
Qty: 2 ML | Refills: 1 | Status: SHIPPED | OUTPATIENT
Start: 2021-03-05 | End: 2021-05-10

## 2021-03-05 RX ORDER — ATENOLOL 50 MG/1
50 TABLET ORAL DAILY
Qty: 90 TABLET | Refills: 0 | Status: SHIPPED | OUTPATIENT
Start: 2021-03-05 | End: 2021-06-09

## 2021-03-05 ASSESSMENT — MIFFLIN-ST. JEOR: SCORE: 1131.85

## 2021-03-05 ASSESSMENT — PAIN SCALES - GENERAL: PAINLEVEL: NO PAIN (0)

## 2021-03-05 NOTE — TELEPHONE ENCOUNTER
Spoke with patient and informed of results below. Patient understood. No further questions or concerns at this time.   Janet Dasilva MA

## 2021-03-05 NOTE — TELEPHONE ENCOUNTER
----- Message from Rene Sharma MD sent at 3/5/2021  1:11 PM CST -----  Please let her know her diabetes is good at 6.9, try off the Jardiance and hopefully the Trulicity is covered by insurance.

## 2021-03-05 NOTE — PROGRESS NOTES
"    Assessment & Plan     Type 2 diabetes mellitus without complication, without long-term current use of insulin (H)  Diabetes has been well controlled with an A1c of 7 but she had a lot of side effects with Jardiance.  Vaginal irritation.  She saw OB/GYN there was no problem but the patient does not like the Jardiance.  We will stop it.  We will try her on Trulicity injections instead.  We had a long discussion and hopefully insurance will cover this.  I told her if is not on her formulary they should send it back and we can try a different one.  If she still has trouble awakening go to insulin.  I did offer her diabetic education but she did not think that was beneficial in the past.  - dulaglutide (TRULICITY) 0.75 MG/0.5ML pen; Inject 0.75 mg Subcutaneous every 7 days  - **A1C FUTURE anytime  - Lipid Profile    Hypertension goal BP (blood pressure) < 140/90  Blood pressure is controlled today 126/86, will refill the atenolol for another 90 days and refill the rest of her pills in June.  - atenolol (TENORMIN) 50 MG tablet; Take 1 tablet (50 mg) by mouth daily    Abnormal SPEP  Patient had a mildly abnormal protein electrophoresis we will recheck this as she is overdue for it.  - Protein Immunofixation Serum  - Protein electrophoresis    Discussed the Covid vaccination and will get her on a wait list to hopefully get it here as she has no Internet access and cannot sign up on her own.             BMI:   Estimated body mass index is 25.69 kg/m  as calculated from the following:    Height as of this encounter: 1.6 m (5' 3\").    Weight as of this encounter: 65.8 kg (145 lb).           No follow-ups on file.    Rene Sharma MD  [facility] is a 71 year old who presents for the following health issues     HPI     Chief Complaint   Patient presents with     Recheck Medication     Diabetes  Htn  Lipids     Living in [facility] in [place],  Needs the covid shot.    Having some " "vaginal issues, lumps, then bleeding. Saw gyn. Not liking jardiance, taking half a 25 mg pill.     Sugars usually 120 to 160, sometimes higher.  Taking metformin and glipizide.       Not much exercise.     Past Medical History:   Diagnosis Date     Diabetic eye exam (H) 04/24/2014    [facility]     Edema      Essential hypertension, benign      Mixed hyperlipidemia      Other forms of migraine, with intractable migraine, so stated, without mention of status migrainosus     resolved after menopause     Type II or unspecified type diabetes mellitus without mention of complication, not stated as uncontrolled      Current Outpatient Medications   Medication     aspirin 81 MG tablet     aspirin-acetaminophen-caffeine (EXCEDRIN EXTRA STRENGTH) 250-250-65 MG per tablet     atenolol (TENORMIN) 50 MG tablet     blood glucose (ACCU-CHEK CHUCHO PLUS) test strip     blood glucose monitoring (ACCU-CHEK CHUCHO PLUS) meter device kit     blood glucose monitoring (SOFTCLIX) lancets     dulaglutide (TRULICITY) 0.75 MG/0.5ML pen     empagliflozin (JARDIANCE) 25 MG TABS tablet     glipiZIDE (GLUCOTROL XL) 10 MG 24 hr tablet     hydrochlorothiazide (HYDRODIURIL) 25 MG tablet     meclizine (ANTIVERT) 25 MG tablet     metFORMIN (GLUCOPHAGE) 500 MG tablet     clobetasol (TEMOVATE) 0.05 % external ointment     ibuprofen (ADVIL/MOTRIN) 200 MG capsule     MULTIVITAMINS OR TABS     simvastatin (ZOCOR) 40 MG tablet     No current facility-administered medications for this visit.      Social History     Tobacco Use     Smoking status: Never Smoker     Smokeless tobacco: Never Used   Substance Use Topics     Alcohol use: No     Drug use: No       Review of Systems   Constitutional, HEENT, cardiovascular, pulmonary, gi and gu systems are negative, except as otherwise noted.      Objective    /86   Pulse 74   Temp 98.2  F (36.8  C) (Temporal)   Resp 16   Ht 1.6 m (5' 3\")   Wt 65.8 kg (145 lb)   SpO2 97%   BMI " 25.69 kg/m    Body mass index is 25.69 kg/m .  Physical Exam   No acute distress  Heart is regular  Lungs are clear  Extremities without edema, positive pulses.

## 2021-03-08 ENCOUNTER — TELEPHONE (OUTPATIENT)
Dept: INTERNAL MEDICINE | Facility: CLINIC | Age: 71
End: 2021-03-08

## 2021-03-08 LAB
ALBUMIN SERPL ELPH-MCNC: 4.2 G/DL (ref 3.7–5.1)
ALPHA1 GLOB SERPL ELPH-MCNC: 0.2 G/DL (ref 0.2–0.4)
ALPHA2 GLOB SERPL ELPH-MCNC: 0.9 G/DL (ref 0.5–0.9)
B-GLOBULIN SERPL ELPH-MCNC: 0.8 G/DL (ref 0.6–1)
GAMMA GLOB SERPL ELPH-MCNC: 1 G/DL (ref 0.7–1.6)
M PROTEIN SERPL ELPH-MCNC: 0.1 G/DL
PROT PATTERN SERPL ELPH-IMP: ABNORMAL

## 2021-03-08 NOTE — TELEPHONE ENCOUNTER
Reason for Call:  Other call back    Detailed comments: Want to know which medication is the better Trulicity and Elliott for diabetes.     Phone Number Patient can be reached at: Home number on file [phone] (home)    Best Time: Any    Can we leave a detailed message on this number? YES    Call taken on 3/8/2021 at 3:11 PM by Chandler Basurto

## 2021-03-09 LAB
IGA SERPL-MCNC: 202 MG/DL (ref 84–499)
IGG SERPL-MCNC: 1091 MG/DL (ref 610–1616)
IGM SERPL-MCNC: 35 MG/DL (ref 35–242)
PROT PATTERN SERPL IFE-IMP: NORMAL

## 2021-03-10 ENCOUNTER — IMMUNIZATION (OUTPATIENT)
Dept: FAMILY MEDICINE | Facility: CLINIC | Age: 71
End: 2021-03-10
Payer: MEDICARE

## 2021-03-10 PROCEDURE — 91301 PR COVID VAC MODERNA 100 MCG/0.5 ML IM: CPT

## 2021-03-10 PROCEDURE — 0011A PR COVID VAC MODERNA 100 MCG/0.5 ML IM: CPT

## 2021-04-07 ENCOUNTER — IMMUNIZATION (OUTPATIENT)
Dept: FAMILY MEDICINE | Facility: CLINIC | Age: 71
End: 2021-04-07
Attending: FAMILY MEDICINE
Payer: MEDICARE

## 2021-04-07 PROCEDURE — 0012A PR COVID VAC MODERNA 100 MCG/0.5 ML IM: CPT

## 2021-04-07 PROCEDURE — 91301 PR COVID VAC MODERNA 100 MCG/0.5 ML IM: CPT

## 2021-05-08 DIAGNOSIS — E11.9 TYPE 2 DIABETES MELLITUS WITHOUT COMPLICATION, WITHOUT LONG-TERM CURRENT USE OF INSULIN (H): ICD-10-CM

## 2021-05-10 RX ORDER — DULAGLUTIDE 0.75 MG/.5ML
0.75 INJECTION, SOLUTION SUBCUTANEOUS
Qty: 4 ML | Refills: 1 | Status: SHIPPED | OUTPATIENT
Start: 2021-05-10 | End: 2021-06-15

## 2021-05-10 NOTE — TELEPHONE ENCOUNTER
Prescription approved per [facility] Refill Protocol.    PHANI CarmenN, RN  [facility]

## 2021-06-11 DIAGNOSIS — E11.9 TYPE 2 DIABETES MELLITUS WITHOUT COMPLICATION, WITHOUT LONG-TERM CURRENT USE OF INSULIN (H): ICD-10-CM

## 2021-06-15 RX ORDER — DULAGLUTIDE 0.75 MG/.5ML
0.75 INJECTION, SOLUTION SUBCUTANEOUS
Qty: 4 ML | Refills: 1 | Status: SHIPPED | OUTPATIENT
Start: 2021-06-15 | End: 2021-08-30

## 2021-08-18 DIAGNOSIS — I10 HYPERTENSION GOAL BP (BLOOD PRESSURE) < 140/90: ICD-10-CM

## 2021-08-18 DIAGNOSIS — E11.9 TYPE 2 DIABETES MELLITUS WITHOUT COMPLICATION, WITHOUT LONG-TERM CURRENT USE OF INSULIN (H): ICD-10-CM

## 2021-08-20 RX ORDER — GLIPIZIDE 10 MG/1
TABLET, FILM COATED, EXTENDED RELEASE ORAL
Qty: 60 TABLET | Refills: 0 | Status: SHIPPED | OUTPATIENT
Start: 2021-08-20 | End: 2021-10-25

## 2021-08-20 RX ORDER — HYDROCHLOROTHIAZIDE 25 MG/1
TABLET ORAL
Qty: 30 TABLET | Refills: 0 | Status: SHIPPED | OUTPATIENT
Start: 2021-08-20 | End: 2021-10-25

## 2021-08-20 NOTE — TELEPHONE ENCOUNTER
"Requested Prescriptions   Pending Prescriptions Disp Refills     glipiZIDE (GLUCOTROL XL) 10 MG 24 hr tablet [Pharmacy Med Name: glipiZIDE ER 10 MG Oral Tablet Extended Release 24 Hour] 180 tablet 0     Sig: Take 1 tablet by mouth twice daily   Last Written Prescription Date:  6/9/2021  Last Fill Quantity: 180,  # refills: 0   Last office visit: 3/5/2021 with prescribing provider:     Future Office Visit:        Sulfonylurea Agents Failed - 8/18/2021  9:22 AM        Failed - Patient has a recent creatinine (normal) within the past 12 mos.     Recent Labs   Lab Test 06/17/20  1053   CR 0.82       Ok to refill medication if creatinine is low          Passed - Patient has documented A1c within the specified period of time.     If HgbA1C is 8 or greater, it needs to be on file within the past 3 months.  If less than 8, must be on file within the past 6 months.     Recent Labs   Lab Test 03/05/21  0943   A1C 6.9*             Passed - Medication is active on med list        Passed - Patient is age 18 or older        Passed - No active pregnancy on record        Passed - Patient has not had a positive pregnancy test within the past 12 mos.        Passed - Recent (6 mo) or future (30 days) visit within the authorizing provider's specialty     Patient had office visit in the last 6 months or has a visit in the next 30 days with authorizing provider or within the authorizing provider's specialty.  See \"Patient Info\" tab in inbasket, or \"Choose Columns\" in Meds & Orders section of the refill encounter.               metFORMIN (GLUCOPHAGE) 500 MG tablet [Pharmacy Med Name: metFORMIN HCl 500 MG Oral Tablet] 360 tablet 0     Sig: TAKE 2 TABLETS BY MOUTH TWICE DAILY WITH MEALS   Sent 6/9/2021 for 360 tablets    Biguanide Agents Failed - 8/18/2021  9:22 AM        Failed - Patient's CR is NOT>1.4 OR Patient's EGFR is NOT<45 within past 12 mos.     Recent Labs   Lab Test 06/17/20  1053   GFRESTIMATED 72   GFRESTBLACK 83       Recent " "Labs   Lab Test 06/17/20  1053   CR 0.82             Passed - Patient is age 10 or older        Passed - Patient has documented A1c within the specified period of time.     If HgbA1C is 8 or greater, it needs to be on file within the past 3 months.  If less than 8, must be on file within the past 6 months.     Recent Labs   Lab Test 03/05/21  0943   A1C 6.9*             Passed - Patient does NOT have a diagnosis of CHF.        Passed - Medication is active on med list        Passed - Patient is not pregnant        Passed - Patient has not had a positive pregnancy test within the past 12 mos.         Passed - Recent (6 mo) or future (30 days) visit within the authorizing provider's specialty     Patient had office visit in the last 6 months or has a visit in the next 30 days with authorizing provider or within the authorizing provider's specialty.  See \"Patient Info\" tab in inbasket, or \"Choose Columns\" in Meds & Orders section of the refill encounter.               hydrochlorothiazide (HYDRODIURIL) 25 MG tablet [Pharmacy Med Name: hydroCHLOROthiazide 25 MG Oral Tablet] 90 tablet 0     Sig: TAKE 1 TABLET BY MOUTH IN THE MORNING   Sent 6/9/2021 for 90 tablets      Diuretics (Including Combos) Protocol Failed - 8/18/2021  9:22 AM        Failed - Normal serum creatinine on file in past 12 months     Recent Labs   Lab Test 06/17/20  1053   CR 0.82              Failed - Normal serum potassium on file in past 12 months     Recent Labs   Lab Test 06/17/20  1053   POTASSIUM 3.7                    Failed - Normal serum sodium on file in past 12 months     Recent Labs   Lab Test 06/17/20  1053                 Passed - Blood pressure under 140/90 in past 12 months     BP Readings from Last 3 Encounters:   03/05/21 126/86   07/08/20 (!) 142/84   06/18/20 124/70                 Passed - Recent (12 mo) or future (30 days) visit within the authorizing provider's specialty     Patient has had an office visit with the " "authorizing provider or a provider within the authorizing providers department within the previous 12 mos or has a future within next 30 days. See \"Patient Info\" tab in inbasket, or \"Choose Columns\" in Meds & Orders section of the refill encounter.              Passed - Medication is active on med list        Passed - Patient is age 18 or older        Passed - No active pregancy on record        Passed - No positive pregnancy test in past 12 months         Routing refill requests to provider for review/approval   Loida Barraza RN          "

## 2021-08-20 NOTE — TELEPHONE ENCOUNTER
glipiZIDE (GLUCOTROL XL) 10 MG 24 hr tablet [Pharmacy Med Name: glipiZIDE ER 10 MG Oral Tablet Extended Release 24 Hour] 180 tablet 0    Sig: Take 1 tablet by mouth twice daily   Routing refill request to provider for review/approval because:  Labs not current:  CR, A1C  T'd up 1 month for provider review.          metFORMIN (GLUCOPHAGE) 500 MG tablet [Pharmacy Med Name: metFORMIN HCl 500 MG Oral Tablet] 360 tablet 0    Sig: TAKE 2 TABLETS BY MOUTH TWICE DAILY WITH MEALS   Routing refill request to provider for review/approval because:  Labs not current:  CR, A1C  T'd up 1 month for provider review.          hydrochlorothiazide (HYDRODIURIL) 25 MG tablet [Pharmacy Med Name: hydroCHLOROthiazide 25 MG Oral Tablet] 90 tablet 0    Sig: TAKE 1 TABLET BY MOUTH IN THE MORNING   Routing refill request to provider for review/approval because:  Labs not current:  CR, Potassium, NA  T'd up 1 month for provider review.      Loida Barraza RN

## 2021-08-28 DIAGNOSIS — E11.9 TYPE 2 DIABETES MELLITUS WITHOUT COMPLICATION, WITHOUT LONG-TERM CURRENT USE OF INSULIN (H): ICD-10-CM

## 2021-08-30 RX ORDER — DULAGLUTIDE 0.75 MG/.5ML
0.75 INJECTION, SOLUTION SUBCUTANEOUS
Qty: 4 ML | Refills: 0 | Status: SHIPPED | OUTPATIENT
Start: 2021-08-30 | End: 2021-10-11

## 2021-08-30 NOTE — TELEPHONE ENCOUNTER
Pending Prescriptions:                       Disp   Refills    TRULICITY 0.75 MG/0.5ML pen [Pharmacy Med *4 mL   0        Sig: INJECT 0.75 MG SUBCUTANEOUS EVERY 7 DAYS.    Routing refill request to provider for review/approval because:  Labs out of range:    Creatinine   Date Value Ref Range Status   06/17/2020 0.82 0.52 - 1.04 mg/dL Final

## 2021-10-01 DIAGNOSIS — E11.9 TYPE 2 DIABETES MELLITUS WITHOUT COMPLICATION, WITHOUT LONG-TERM CURRENT USE OF INSULIN (H): Primary | ICD-10-CM

## 2021-10-05 NOTE — TELEPHONE ENCOUNTER
Routing refill request to provider for review/approval because:  A break in medication, test strips were last prescribed in 2019    PHANI CarmenN, RN  [facility]

## 2021-10-06 DIAGNOSIS — E11.9 TYPE 2 DIABETES MELLITUS WITHOUT COMPLICATION, WITHOUT LONG-TERM CURRENT USE OF INSULIN (H): ICD-10-CM

## 2021-10-06 NOTE — TELEPHONE ENCOUNTER
Pt requesting order for colonoscopy Received fax that medicare won't pay if it says as directed.  Sign new order if correct.

## 2021-10-11 RX ORDER — DULAGLUTIDE 0.75 MG/.5ML
0.75 INJECTION, SOLUTION SUBCUTANEOUS
Qty: 4 ML | Refills: 0 | Status: SHIPPED | OUTPATIENT
Start: 2021-10-11 | End: 2021-10-25

## 2021-10-11 NOTE — TELEPHONE ENCOUNTER
Patient has an appointment in 2 weeks.    Routing refill request to provider for review/approval because:  Mavis given x1 and patient did not follow up, please advise  Patient needs to be seen because:  due for diabetic check    FANI Carmen, RN  [facility]

## 2021-10-25 ENCOUNTER — OFFICE VISIT (OUTPATIENT)
Dept: INTERNAL MEDICINE | Facility: CLINIC | Age: 71
End: 2021-10-25
Payer: MEDICARE

## 2021-10-25 VITALS
TEMPERATURE: 97.3 F | DIASTOLIC BLOOD PRESSURE: 72 MMHG | WEIGHT: 145.6 LBS | OXYGEN SATURATION: 97 % | BODY MASS INDEX: 25.79 KG/M2 | RESPIRATION RATE: 18 BRPM | SYSTOLIC BLOOD PRESSURE: 122 MMHG | HEART RATE: 88 BPM

## 2021-10-25 DIAGNOSIS — Z23 NEED FOR VACCINATION: ICD-10-CM

## 2021-10-25 DIAGNOSIS — R41.3 MEMORY LOSS: ICD-10-CM

## 2021-10-25 DIAGNOSIS — E11.9 TYPE 2 DIABETES MELLITUS WITHOUT COMPLICATION, WITHOUT LONG-TERM CURRENT USE OF INSULIN (H): Primary | ICD-10-CM

## 2021-10-25 DIAGNOSIS — I10 HYPERTENSION GOAL BP (BLOOD PRESSURE) < 140/90: ICD-10-CM

## 2021-10-25 DIAGNOSIS — E78.5 HYPERLIPIDEMIA LDL GOAL <100: ICD-10-CM

## 2021-10-25 LAB
ALBUMIN SERPL-MCNC: 3.8 G/DL (ref 3.4–5)
ALP SERPL-CCNC: 57 U/L (ref 40–150)
ALT SERPL W P-5'-P-CCNC: 25 U/L (ref 0–50)
ANION GAP SERPL CALCULATED.3IONS-SCNC: 3 MMOL/L (ref 3–14)
AST SERPL W P-5'-P-CCNC: 15 U/L (ref 0–45)
BILIRUB SERPL-MCNC: 0.4 MG/DL (ref 0.2–1.3)
BUN SERPL-MCNC: 19 MG/DL (ref 7–30)
CALCIUM SERPL-MCNC: 9.1 MG/DL (ref 8.5–10.1)
CHLORIDE BLD-SCNC: 103 MMOL/L (ref 94–109)
CO2 SERPL-SCNC: 31 MMOL/L (ref 20–32)
CREAT SERPL-MCNC: 0.84 MG/DL (ref 0.52–1.04)
GFR SERPL CREATININE-BSD FRML MDRD: 69 ML/MIN/1.73M2
GLUCOSE BLD-MCNC: 126 MG/DL (ref 70–99)
HBA1C MFR BLD: 6.3 % (ref 0–5.6)
POTASSIUM BLD-SCNC: 3.2 MMOL/L (ref 3.4–5.3)
PROT SERPL-MCNC: 7.2 G/DL (ref 6.8–8.8)
SODIUM SERPL-SCNC: 137 MMOL/L (ref 133–144)

## 2021-10-25 PROCEDURE — 36415 COLL VENOUS BLD VENIPUNCTURE: CPT | Performed by: INTERNAL MEDICINE

## 2021-10-25 PROCEDURE — 90662 IIV NO PRSV INCREASED AG IM: CPT | Performed by: INTERNAL MEDICINE

## 2021-10-25 PROCEDURE — G0008 ADMIN INFLUENZA VIRUS VAC: HCPCS | Performed by: INTERNAL MEDICINE

## 2021-10-25 PROCEDURE — 80053 COMPREHEN METABOLIC PANEL: CPT | Performed by: INTERNAL MEDICINE

## 2021-10-25 PROCEDURE — 99214 OFFICE O/P EST MOD 30 MIN: CPT | Mod: 25 | Performed by: INTERNAL MEDICINE

## 2021-10-25 PROCEDURE — 83036 HEMOGLOBIN GLYCOSYLATED A1C: CPT | Performed by: INTERNAL MEDICINE

## 2021-10-25 RX ORDER — HYDROCHLOROTHIAZIDE 25 MG/1
TABLET ORAL
Qty: 90 TABLET | Refills: 3 | Status: SHIPPED | OUTPATIENT
Start: 2021-10-25 | End: 2022-11-18

## 2021-10-25 RX ORDER — DULAGLUTIDE 0.75 MG/.5ML
0.75 INJECTION, SOLUTION SUBCUTANEOUS
Qty: 4 ML | Refills: 11 | Status: SHIPPED | OUTPATIENT
Start: 2021-10-25 | End: 2022-09-22

## 2021-10-25 RX ORDER — POTASSIUM CHLORIDE 750 MG/1
10 TABLET, EXTENDED RELEASE ORAL 2 TIMES DAILY
Qty: 90 TABLET | Refills: 1 | Status: SHIPPED | OUTPATIENT
Start: 2021-10-25 | End: 2021-12-15

## 2021-10-25 RX ORDER — GLIPIZIDE 10 MG/1
10 TABLET, FILM COATED, EXTENDED RELEASE ORAL 2 TIMES DAILY
Qty: 180 TABLET | Refills: 3 | Status: SHIPPED | OUTPATIENT
Start: 2021-10-25 | End: 2022-12-21

## 2021-10-25 ASSESSMENT — PAIN SCALES - GENERAL: PAINLEVEL: NO PAIN (0)

## 2021-10-25 NOTE — PROGRESS NOTES
"  Assessment & Plan     Type 2 diabetes mellitus without complication, without long-term current use of insulin (H)  Patient is diabetes is doing well.  She is on Trulicity not having side effects of Jardiance.  Unfortunately the Trulicity is expensive she is in a try to change her drug coverage insurance.  I wrote down possible med she could be on including Trulicity, Ozempic, or Victoza.  - Hemoglobin A1c; Future  - Comprehensive metabolic panel (BMP + Alb, Alk Phos, ALT, AST, Total. Bili, TP); Future  - glipiZIDE (GLUCOTROL XL) 10 MG 24 hr tablet; Take 1 tablet (10 mg) by mouth 2 times daily  - metFORMIN (GLUCOPHAGE) 500 MG tablet; TAKE 2 TABLETS BY MOUTH TWICE DAILY WITH MEALS  - dulaglutide (TRULICITY) 0.75 MG/0.5ML pen; Inject 0.75 mg Subcutaneous every 7 days  - blood glucose (NO BRAND SPECIFIED) test strip; Use to test blood sugar 2 times daily.    Hypertension goal BP (blood pressure) < 140/90  Blood pressure is controlled we will refill her hydrochlorothiazide, blood pressure today is good at 122/72.  - hydrochlorothiazide (HYDRODIURIL) 25 MG tablet; TAKE 1 TABLET BY MOUTH IN THE MORNING    Hyperlipidemia LDL goal <100  Hyperlipidemia continue on her statin.  Lipids were done in the spring.    Memory loss  Ga loss which is a concern she is got a slight monoclonal antibody bump on her SPEP.  B12 and thyroid were stable.  She does not want to go to neuropsych testing I will refer her to occupational therapy for additional evaluation and safety.  - Occupational Therapy Referral; Future             BMI:   Estimated body mass index is 25.79 kg/m  as calculated from the following:    Height as of 3/5/21: 1.6 m (5' 3\").    Weight as of this encounter: 66 kg (145 lb 9.6 oz).           Return in about 6 months (around 4/25/2022) for Physical Exam.    Rene Sharma MD  [facility]    Jayde Youngblood is a 71 year old who presents for the following health issues     HPI   Chief " Complaint   Patient presents with     Recheck Medication     Feeling good since off Jardiance.  Trulucity is good but expensive.    Weight is stable at 145 here.     Sugars are doing ok. Checks each morning, 100-150 range.       Memory is going she says.       Past Medical History:   Diagnosis Date     Diabetic eye exam (H) 04/24/2014    [facility]     Edema      Essential hypertension, benign      Mixed hyperlipidemia      Other forms of migraine, with intractable migraine, so stated, without mention of status migrainosus     resolved after menopause     Type II or unspecified type diabetes mellitus without mention of complication, not stated as uncontrolled      Current Outpatient Medications   Medication     aspirin 81 MG tablet     atenolol (TENORMIN) 50 MG tablet     blood glucose (ACCU-CHEK CHUCHO PLUS) test strip     blood glucose (NO BRAND SPECIFIED) test strip     blood glucose monitoring (ACCU-CHEK CHUCHO PLUS) meter device kit     blood glucose monitoring (SOFTCLIX) lancets     glipiZIDE (GLUCOTROL XL) 10 MG 24 hr tablet     hydrochlorothiazide (HYDRODIURIL) 25 MG tablet     ibuprofen (ADVIL/MOTRIN) 200 MG capsule     meclizine (ANTIVERT) 25 MG tablet     metFORMIN (GLUCOPHAGE) 500 MG tablet     MULTIVITAMINS OR TABS     simvastatin (ZOCOR) 40 MG tablet     TRULICITY 0.75 MG/0.5ML pen     aspirin-acetaminophen-caffeine (EXCEDRIN EXTRA STRENGTH) 250-250-65 MG per tablet     clobetasol (TEMOVATE) 0.05 % external ointment     No current facility-administered medications for this visit.     Social History     Tobacco Use     Smoking status: Never Smoker     Smokeless tobacco: Never Used   Substance Use Topics     Alcohol use: No     Drug use: No       Review of Systems   Constitutional, HEENT, cardiovascular, pulmonary, gi and gu systems are negative, except as otherwise noted.      Objective    /72   Pulse 88   Temp 97.3  F (36.3  C) (Temporal)   Resp 18   Wt 66 kg (145 lb 9.6  oz)   SpO2 97%   BMI 25.79 kg/m    Body mass index is 25.79 kg/m .  Physical Exam   No acute distress  Memory is poor patient cannot remember some of her things she is looking for in her purse.  Heart is regular  Lungs are clear  Extremities without edema

## 2021-10-25 NOTE — NURSING NOTE
Prior to immunization administration, verified patients identity using patient s name and date of birth. Please see Immunization Activity for additional information.     Screening Questionnaire for Adult Immunization    Are you sick today?   No   Do you have allergies to medications, food, a vaccine component or latex?   No   Have you ever had a serious reaction after receiving a vaccination?   No   Do you have a long-term health problem with heart, lung, kidney, or metabolic disease (e.g., diabetes), asthma, a blood disorder, no spleen, complement component deficiency, a cochlear implant, or a spinal fluid leak?  Are you on long-term aspirin therapy?   No   Do you have cancer, leukemia, HIV/AIDS, or any other immune system problem?   No   Do you have a parent, brother, or sister with an immune system problem?   No   In the past 3 months, have you taken medications that affect  your immune system, such as prednisone, other steroids, or anticancer drugs; drugs for the treatment of rheumatoid arthritis, Crohn s disease, or psoriasis; or have you had radiation treatments?   No   Have you had a seizure, or a brain or other nervous system problem?   No   During the past year, have you received a transfusion of blood or blood    products, or been given immune (gamma) globulin or antiviral drug?   No   For women: Are you pregnant or is there a chance you could become       pregnant during the next month?   No   Have you received any vaccinations in the past 4 weeks?   No     Immunization questionnaire answers were all negative.      Patient instructed to remain in clinic for 15 minutes afterwards, and to report any adverse reaction to me immediately.       Screening performed by Leanne Duff on 10/25/2021 at 2:33 PM.

## 2021-12-15 DIAGNOSIS — I10 HYPERTENSION GOAL BP (BLOOD PRESSURE) < 140/90: ICD-10-CM

## 2021-12-17 RX ORDER — POTASSIUM CHLORIDE 750 MG/1
10 TABLET, EXTENDED RELEASE ORAL 2 TIMES DAILY
Qty: 180 TABLET | Refills: 0 | Status: SHIPPED | OUTPATIENT
Start: 2021-12-17 | End: 2022-04-15

## 2021-12-17 NOTE — TELEPHONE ENCOUNTER
Klor-Con  Routing refill request to provider for review/approval because:  Labs out of range:  Potassium    Loida Barraza RN

## 2021-12-27 DIAGNOSIS — E78.5 HYPERLIPIDEMIA LDL GOAL <100: ICD-10-CM

## 2021-12-27 RX ORDER — SIMVASTATIN 40 MG
40 TABLET ORAL DAILY
Qty: 90 TABLET | Refills: 1 | Status: SHIPPED | OUTPATIENT
Start: 2021-12-27 | End: 2022-06-16

## 2021-12-27 NOTE — TELEPHONE ENCOUNTER
Patient calls and states that she had asked brandy roe to call and transfer her rx's from [facility] to brandy roe. She was told all rx's were transferred but the simvastatin. When I looked at rx she should still have 1 refill left. I called brandy roe and gave verbal auth for the last refill of 90 tablets.  Patient has been out for 1 week due to this issue

## 2021-12-27 NOTE — TELEPHONE ENCOUNTER
See note below, pharmacy reports still not having script.  She is requesting a new script be sent to [facility].

## 2021-12-27 NOTE — TELEPHONE ENCOUNTER
Prescription approved per [facility] Refill Protocol.    Nohemi Torres Rn

## 2022-03-22 ENCOUNTER — TELEPHONE (OUTPATIENT)
Dept: INTERNAL MEDICINE | Facility: CLINIC | Age: 72
End: 2022-03-22
Payer: COMMERCIAL

## 2022-03-22 NOTE — TELEPHONE ENCOUNTER
Patient has an upcoming appointment on 4/11/22 with PCP, but is reporting she is going to the store today and is wanting to :  Prevagen     To help with memory.  She is wanting to know if this will be OK to take with the medications she is currently taking.    Routing to PCP for further advice.    Carla Thomson RN

## 2022-04-13 DIAGNOSIS — I10 HYPERTENSION GOAL BP (BLOOD PRESSURE) < 140/90: ICD-10-CM

## 2022-04-14 NOTE — TELEPHONE ENCOUNTER
Pending Prescriptions:                       Disp   Refills    potassium chloride ER (KLOR-CON M) 10 MEQ *180 ta*0        Sig: TAKE ONE TABLET BY MOUTH TWICE A DAY    Routing refill request to provider for review/approval because:  Labs out of range:    Potassium   Date Value Ref Range Status   10/25/2021 3.2 (L) 3.4 - 5.3 mmol/L Final   06/17/2020 3.7 3.4 - 5.3 mmol/L Final

## 2022-04-15 RX ORDER — POTASSIUM CHLORIDE 750 MG/1
TABLET, EXTENDED RELEASE ORAL
Qty: 180 TABLET | Refills: 0 | Status: SHIPPED | OUTPATIENT
Start: 2022-04-15 | End: 2022-04-23

## 2022-04-23 ENCOUNTER — NURSE TRIAGE (OUTPATIENT)
Dept: NURSING | Facility: CLINIC | Age: 72
End: 2022-04-23
Payer: COMMERCIAL

## 2022-04-23 DIAGNOSIS — I10 HYPERTENSION GOAL BP (BLOOD PRESSURE) < 140/90: ICD-10-CM

## 2022-04-23 RX ORDER — POTASSIUM CHLORIDE 750 MG/1
10 TABLET, EXTENDED RELEASE ORAL 2 TIMES DAILY
Qty: 180 TABLET | Refills: 0 | Status: SHIPPED | OUTPATIENT
Start: 2022-04-23 | End: 2022-07-21

## 2022-04-23 NOTE — TELEPHONE ENCOUNTER
Pt calling that she will be out of her Potassium Chloride tablets after tomorrow.  Rx refilled per RN Refill protocol.  Last Written Prescription Date:  4/15/22  Last Fill Quantity: 180,  # refills: 0   Last office visit provider:  10/25/21     Requested Prescriptions   Pending Prescriptions Disp Refills     potassium chloride ER (KLOR-CON M) 10 MEQ CR tablet 180 tablet 0     Sig: Take 1 tablet (10 mEq) by mouth 2 times daily       There is no refill protocol information for this order          Debi Flynn 04/23/22 1:19 PM

## 2022-04-26 RX ORDER — POTASSIUM CHLORIDE 750 MG/1
TABLET, EXTENDED RELEASE ORAL
Qty: 180 TABLET | Refills: 0 | OUTPATIENT
Start: 2022-04-26

## 2022-04-26 NOTE — TELEPHONE ENCOUNTER
Mil Cummings denied  Duplicate request  Sent to this pharmacy on 4/23/2022 for 180 tablets    Loida Barraza RN

## 2022-04-29 DIAGNOSIS — I10 HYPERTENSION GOAL BP (BLOOD PRESSURE) < 140/90: ICD-10-CM

## 2022-04-29 RX ORDER — ATENOLOL 50 MG/1
TABLET ORAL
Qty: 90 TABLET | Refills: 0 | Status: SHIPPED | OUTPATIENT
Start: 2022-04-29 | End: 2022-08-04

## 2022-05-02 ENCOUNTER — HOSPITAL ENCOUNTER (OUTPATIENT)
Dept: MAMMOGRAPHY | Facility: CLINIC | Age: 72
Discharge: HOME | End: 2022-05-02
Attending: INTERNAL MEDICINE | Admitting: INTERNAL MEDICINE
Payer: MEDICARE

## 2022-05-02 ENCOUNTER — OFFICE VISIT (OUTPATIENT)
Dept: INTERNAL MEDICINE | Facility: CLINIC | Age: 72
End: 2022-05-02
Payer: MEDICARE

## 2022-05-02 VITALS
BODY MASS INDEX: 24.98 KG/M2 | WEIGHT: 141 LBS | SYSTOLIC BLOOD PRESSURE: 128 MMHG | OXYGEN SATURATION: 97 % | TEMPERATURE: 98 F | DIASTOLIC BLOOD PRESSURE: 84 MMHG | RESPIRATION RATE: 16 BRPM | HEART RATE: 90 BPM | HEIGHT: 63 IN

## 2022-05-02 DIAGNOSIS — E78.5 HYPERLIPIDEMIA LDL GOAL <100: ICD-10-CM

## 2022-05-02 DIAGNOSIS — Z00.00 ENCOUNTER FOR MEDICARE ANNUAL WELLNESS EXAM: ICD-10-CM

## 2022-05-02 DIAGNOSIS — Z00.00 MEDICARE ANNUAL WELLNESS VISIT, SUBSEQUENT: ICD-10-CM

## 2022-05-02 DIAGNOSIS — E11.69 TYPE 2 DIABETES MELLITUS WITH OTHER SPECIFIED COMPLICATION, WITHOUT LONG-TERM CURRENT USE OF INSULIN (H): ICD-10-CM

## 2022-05-02 DIAGNOSIS — Z12.31 VISIT FOR SCREENING MAMMOGRAM: ICD-10-CM

## 2022-05-02 DIAGNOSIS — E11.9 TYPE 2 DIABETES MELLITUS WITHOUT COMPLICATION, WITHOUT LONG-TERM CURRENT USE OF INSULIN (H): ICD-10-CM

## 2022-05-02 DIAGNOSIS — I10 HYPERTENSION GOAL BP (BLOOD PRESSURE) < 140/90: Primary | ICD-10-CM

## 2022-05-02 DIAGNOSIS — R77.8 ABNORMAL SPEP: ICD-10-CM

## 2022-05-02 DIAGNOSIS — Z13.220 SCREENING FOR HYPERLIPIDEMIA: ICD-10-CM

## 2022-05-02 LAB
ALBUMIN SERPL-MCNC: 4.1 G/DL (ref 3.4–5)
ALP SERPL-CCNC: 57 U/L (ref 40–150)
ALT SERPL W P-5'-P-CCNC: 25 U/L (ref 0–50)
ANION GAP SERPL CALCULATED.3IONS-SCNC: 5 MMOL/L (ref 3–14)
AST SERPL W P-5'-P-CCNC: 15 U/L (ref 0–45)
BILIRUB SERPL-MCNC: 0.3 MG/DL (ref 0.2–1.3)
BUN SERPL-MCNC: 23 MG/DL (ref 7–30)
CALCIUM SERPL-MCNC: 9.4 MG/DL (ref 8.5–10.1)
CHLORIDE BLD-SCNC: 103 MMOL/L (ref 94–109)
CHOLEST SERPL-MCNC: 174 MG/DL
CO2 SERPL-SCNC: 31 MMOL/L (ref 20–32)
CREAT SERPL-MCNC: 0.9 MG/DL (ref 0.52–1.04)
CREAT UR-MCNC: 148 MG/DL
FASTING STATUS PATIENT QL REPORTED: NO
GFR SERPL CREATININE-BSD FRML MDRD: 67 ML/MIN/1.73M2
GLUCOSE BLD-MCNC: 118 MG/DL (ref 70–99)
HBA1C MFR BLD: 6.2 % (ref 0–5.6)
HDLC SERPL-MCNC: 71 MG/DL
LDLC SERPL CALC-MCNC: 79 MG/DL
MICROALBUMIN UR-MCNC: 26 MG/L
MICROALBUMIN/CREAT UR: 17.57 MG/G CR (ref 0–25)
NONHDLC SERPL-MCNC: 103 MG/DL
POTASSIUM BLD-SCNC: 3.4 MMOL/L (ref 3.4–5.3)
PROT SERPL-MCNC: 7.5 G/DL (ref 6.8–8.8)
SODIUM SERPL-SCNC: 139 MMOL/L (ref 133–144)
TRIGL SERPL-MCNC: 121 MG/DL

## 2022-05-02 PROCEDURE — 83036 HEMOGLOBIN GLYCOSYLATED A1C: CPT | Performed by: INTERNAL MEDICINE

## 2022-05-02 PROCEDURE — 82043 UR ALBUMIN QUANTITATIVE: CPT | Performed by: INTERNAL MEDICINE

## 2022-05-02 PROCEDURE — 77067 SCR MAMMO BI INCL CAD: CPT

## 2022-05-02 PROCEDURE — 80061 LIPID PANEL: CPT | Performed by: INTERNAL MEDICINE

## 2022-05-02 PROCEDURE — 80053 COMPREHEN METABOLIC PANEL: CPT | Performed by: INTERNAL MEDICINE

## 2022-05-02 PROCEDURE — 86334 IMMUNOFIX E-PHORESIS SERUM: CPT

## 2022-05-02 PROCEDURE — 36415 COLL VENOUS BLD VENIPUNCTURE: CPT | Performed by: INTERNAL MEDICINE

## 2022-05-02 PROCEDURE — G0439 PPPS, SUBSEQ VISIT: HCPCS | Performed by: INTERNAL MEDICINE

## 2022-05-02 ASSESSMENT — PAIN SCALES - GENERAL: PAINLEVEL: NO PAIN (0)

## 2022-05-02 ASSESSMENT — ACTIVITIES OF DAILY LIVING (ADL): CURRENT_FUNCTION: NO ASSISTANCE NEEDED

## 2022-05-02 NOTE — PATIENT INSTRUCTIONS
Patient Education   Personalized Prevention Plan  You are due for the preventive services outlined below.  Your care team is available to assist you in scheduling these services.  If you have already completed any of these items, please share that information with your care team to update in your medical record.  Health Maintenance Due   Topic Date Due     Osteoporosis Screening  Never done     Hepatitis C Screening  Never done     Zoster (Shingles) Vaccine (1 of 2) Never done     Diptheria Tetanus Pertussis (DTAP/TDAP/TD) Vaccine (1 - Tdap) 02/17/2012     Diabetic Foot Exam  02/06/2014     FALL RISK ASSESSMENT  10/09/2020     Kidney Microalbumin Urine Test  06/17/2021     Cholesterol Lab  03/05/2022     Eye Exam  03/05/2022     A1C Lab  04/25/2022       Exercise for a Healthier Heart  You may wonder how you can improve the health of your heart. If you re thinking about exercise, you re on the right track. You don t need to become an athlete. But you do need a certain amount of brisk exercise to help strengthen your heart. If you have been diagnosed with a heart condition, your healthcare provider may advise exercise to help stabilize your condition. To help make exercise a habit, choose safe, fun activities.      Exercise with a friend. When activity is fun, you're more likely to stick with it.   Before you start  Check with your healthcare provider before starting an exercise program. This is especially important if you have not been active for a while. It's also important if you have a long-term (chronic) health problem such as heart disease, diabetes, or obesity. Or if you are at high risk for having these problems.   Why exercise?  Exercising regularly offers many healthy rewards. It can help you do all of the following:     Improve your blood cholesterol level to help prevent further heart trouble    Lower your blood pressure to help prevent a stroke or heart attack    Control diabetes, or reduce your risk  of getting this disease    Improve your heart and lung function    Reach and stay at a healthy weight    Make your muscles stronger so you can stay active    Prevent falls and fractures by slowing the loss of bone mass (osteoporosis)    Manage stress better    Reduce your blood pressure    Improve your sense of self and your body image  Exercise tips      Ease into your routine. Set small goals. Then build on them. If you are not sure what your activity level should be, talk with your healthcare provider first before starting an exercise routine.    Exercise on most days. Aim for a total of 150 minutes (2 hours and 30 minutes) or more of moderate-intensity aerobic activity each week. Or 75 minutes (1 hour and 15 minutes) or more of vigorous-intensity aerobic activity each week. Or try for a combination of both. Moderate activity means that you breathe heavier and your heart rate increases but you can still talk. Think about doing 40 minutes of moderate exercise, 3 to 4 times a week. For best results, activity should last for about 40 minutes to lower blood pressure and cholesterol. It's OK to work up to the 40-minute period over time. Examples of moderate-intensity activity are walking 1 mile in 15 minutes. Or doing 30 to 45 minutes of yard work.    Step up your daily activity level.  Along with your exercise program, try being more active the whole day. Walk instead of drive. Or park further away so that you take more steps each day. Do more household tasks or yard work. You may not be able to meet the advised mount of physical activity. But doing some moderate- or vigorous-intensity aerobic activity can help reduce your risk for heart disease. Your healthcare provider can help you figure out what is best for you.    Choose 1 or more activities you enjoy.  Walking is one of the easiest things you can do. You can also try swimming, riding a bike, dancing, or taking an exercise class.    When to call your healthcare  provider  Call your healthcare provider if you have any of these:     Chest pain or feel dizzy or lightheaded    Burning, tightness, pressure, or heaviness in your chest, neck, shoulders, back, or arms    Abnormal shortness of breath    More joint or muscle pain    A very fast or irregular heartbeat (palpitations)  Mert last reviewed this educational content on 7/1/2019 2000-2021 [employer]. All rights reserved. This information is not intended as a substitute for professional medical care. Always follow your healthcare professional's instructions.          Understanding USDA MyPlate  The USDA has guidelines to help you make healthy food choices. These are called MyPlate. MyPlate shows the food groups that make up healthy meals using the image of a place setting. Before you eat, think about the healthiest choices for what to put on your plate or in your cup or bowl. To learn more about building a healthy plate, visit www.choosemyplate.gov.    The food groups    Fruits. Any fruit or 100% fruit juice counts as part of the Fruit Group. Fruits may be fresh, canned, frozen, or dried, and may be whole, cut-up, or pureed. Make 1/2 of your plate fruits and vegetables.    Vegetables. Any vegetable or 100% vegetable juice counts as a member of the Vegetable Group. Vegetables may be fresh, frozen, canned, or dried. They can be served raw or cooked and may be whole, cut-up, or mashed. Make 1/2 of your plate fruits and vegetables.    Grains. All foods made from grains are part of the Grains Group. These include wheat, rice, oats, cornmeal, and barley. Grains are often used to make foods such as bread, pasta, oatmeal, cereal, tortillas, and grits. Grains should be no more than 1/4 of your plate. At least half of your grains should be whole grains.    Protein. This group includes meat, poultry, seafood, beans and peas, eggs, processed soy products (such as tofu), nuts (including nut butters), and seeds. Make  protein choices no more than 1/4 of your plate. Meat and poultry choices should be lean or low fat.    Dairy. The Dairy Group includes all fluid milk products and foods made from milk that contain calcium, such as yogurt and cheese. (Foods that have little calcium, such as cream, butter, and cream cheese, are not part of this group.) Most dairy choices should be low-fat or fat-free.    Oils. Oils aren't a food group, but they do contain essential nutrients. However it's important to watch your intake of oils. These are fats that are liquid at room temperature. They include canola, corn, olive, soybean, vegetable, and sunflower oil. Foods that are mainly oil include mayonnaise, certain salad dressings, and soft margarines. You likely already get your daily oil allowance from the foods you eat.  Things to limit  Eating healthy also means limiting these things in your diet:       Salt (sodium). Many processed foods have a lot of sodium. To keep sodium intake down, eat fresh vegetables, meats, poultry, and seafood when possible. Purchase low-sodium, reduced-sodium, or no-salt-added food products at the store. And don't add salt to your meals at home. Instead, season them with herbs and spices such as dill, oregano, cumin, and paprika. Or try adding flavor with lemon or lime zest and juice.    Saturated fat. Saturated fats are most often found in animal products such as beef, pork, and chicken. They are often solid at room temperature, such as butter. To reduce your saturated fat intake, choose leaner cuts of meat and poultry. And try healthier cooking methods such as grilling, broiling, roasting, or baking. For a simple lower-fat swap, use plain nonfat yogurt instead of mayonnaise when making potato salad or macaroni salad.    Added sugars. These are sugars added to foods. They are in foods such as ice cream, candy, soda, fruit drinks, sports drinks, energy drinks, cookies, pastries, jams, and syrups. Cut down on added  sugars by sharing sweet treats with a family member or friend. You can also choose fruit for dessert, and drink water or other unsweetened beverages.     [employer] last reviewed this educational content on 6/1/2020 2000-2021 [employer]. All rights reserved. This information is not intended as a substitute for professional medical care. Always follow your healthcare professional's instructions.

## 2022-05-02 NOTE — PROGRESS NOTES
"SUBJECTIVE:   Rylie Young is a 72 year old female who presents for Preventive Visit.    Patient has been advised of split billing requirements and indicates understanding: Yes  Are you in the first 12 months of your Medicare coverage?  No    Healthy Habits:     In general, how would you rate your overall health?  Good    Frequency of exercise:  None    Duration of exercise:  Less than 15 minutes    Do you usually eat at least 4 servings of fruit and vegetables a day, include whole grains    & fiber and avoid regularly eating high fat or \"junk\" foods?  No    Taking medications regularly:  No    Barriers to taking medications:  None    Medication side effects:  None    Ability to successfully perform activities of daily living:  No assistance needed    Home Safety:  No safety concerns identified    Hearing Impairment:  No hearing concerns    In the past 6 months, have you been bothered by leaking of urine?  No    In general, how would you rate your overall mental or emotional health?  Good      PHQ-2 Total Score: 2    Additional concerns today:  Yes    Doing ok,     Memory issues that come and go, now on prevagen.      Diabetes is ok with metformin, glipizide, and trulucity.  Sugars are 105 to 170 range.   Do you feel safe in your environment? Yes    Have you ever done Advance Care Planning? (For example, a Health Directive, POLST, or a discussion with a medical provider or your loved ones about your wishes): No, advance care planning information given to patient to review.  Advanced care planning was discussed at today's visit.       Fall risk  Fallen 2 or more times in the past year?: No  Any fall with injury in the past year?: No    Cognitive Screening   1) Repeat 3 items (Leader, Season, Table)    2) Clock draw: NORMAL  3) 3 item recall: Recalls NO objects   Results: 0 items recalled: PROBABLE COGNITIVE IMPAIRMENT, **INFORM PROVIDER**  Memory issues but doesn't admit that   Mini-CogTM Copyright S Borson. " Licensed by the author for use in [facility]; reprinted with permission ([email or web address]). All rights reserved.      Do you have sleep apnea, excessive snoring or daytime drowsiness?: no    Reviewed and updated as needed this visit by clinical staff   Tobacco   Meds                Reviewed and updated as needed this visit by Provider                   Social History     Tobacco Use     Smoking status: Never Smoker     Smokeless tobacco: Never Used   Substance Use Topics     Alcohol use: No     Alcohol Use 2/14/2017   Prescreen: >3 drinks/day or >7 drinks/week? The patient does not drink >3 drinks per day nor >7 drinks per week.     Current providers sharing in care for this patient include:   Patient Care Team:  Rene Sharma MD as PCP - General (Internal Medicine)  Rene Sharma MD as Assigned PCP    The following health maintenance items are reviewed in Epic and correct as of today:  Health Maintenance Due   Topic Date Due     DEXA  Never done     ANNUAL REVIEW OF HM ORDERS  Never done     HEPATITIS C SCREENING  Never done     ZOSTER IMMUNIZATION (1 of 2) Never done     DTAP/TDAP/TD IMMUNIZATION (1 - Tdap) 02/17/2012     DIABETIC FOOT EXAM  02/06/2014     ADVANCE CARE PLANNING  02/07/2017     MEDICARE ANNUAL WELLNESS VISIT  02/14/2018     FALL RISK ASSESSMENT  10/09/2020     MICROALBUMIN  06/17/2021     LIPID  03/05/2022     EYE EXAM  03/05/2022     A1C  04/25/2022     Lab work is in process  Pneumonia Vaccine:doesn't want pneumonia 23.     Review of Systems  CONSTITUTIONAL: NEGATIVE for fever, chills, change in weight  INTEGUMENTARY/SKIN: NEGATIVE for worrisome rashes, moles or lesions  EYES: NEGATIVE for vision changes or irritation  ENT/MOUTH: NEGATIVE for ear, mouth and throat problems  RESP: NEGATIVE for significant cough or SOB  BREAST: NEGATIVE for masses, tenderness or discharge  CV: NEGATIVE for chest pain, palpitations or peripheral edema  GI: NEGATIVE for nausea, abdominal pain,  "heartburn, or change in bowel habits  : NEGATIVE for frequency, dysuria, or hematuria  MUSCULOSKELETAL: NEGATIVE for significant arthralgias or myalgia  NEURO: memory issues,   ENDOCRINE: NEGATIVE for temperature intolerance, skin/hair changes  HEME: NEGATIVE for bleeding problems  PSYCHIATRIC: NEGATIVE for changes in mood or affect    OBJECTIVE:   /84   Pulse 90   Temp 98  F (36.7  C) (Temporal)   Resp 16   Ht 1.6 m (5' 3\")   Wt 64 kg (141 lb)   SpO2 97%   BMI 24.98 kg/m   Estimated body mass index is 24.98 kg/m  as calculated from the following:    Height as of this encounter: 1.6 m (5' 3\").    Weight as of this encounter: 64 kg (141 lb).  Physical Exam  GENERAL: healthy, alert and no distress  EYES: Eyes grossly normal to inspection, PERRL and conjunctivae and sclerae normal  HENT: ear canals and TM's normal, nose and mouth without ulcers or lesions  NECK: no adenopathy, no asymmetry, masses, or scars and thyroid normal to palpation  RESP: lungs clear to auscultation - no rales, rhonchi or wheezes  CV: regular rate and rhythm, normal S1 S2, no S3 or S4, no murmur, click or rub, no peripheral edema and peripheral pulses strong  ABDOMEN: soft, nontender, no hepatosplenomegaly, no masses and bowel sounds normal  MS: no gross musculoskeletal defects noted, no edema  SKIN: no suspicious lesions or rashes  NEURO: Normal strength and tone, memory is off   PSYCH: mentation appears normal, affect normal/bright        ASSESSMENT / PLAN:       ICD-10-CM    1. Hypertension goal BP (blood pressure) < 140/90  I10    2. Need for hepatitis C screening test  Z11.59    3. Screening for hyperlipidemia  Z13.220 Lipid panel reflex to direct LDL Non-fasting   4. Type 2 diabetes mellitus without complication, without long-term current use of insulin (H)  E11.9 Albumin Random Urine Quantitative with Creat Ratio     Lipid panel reflex to direct LDL Non-fasting     HEMOGLOBIN A1C     Comprehensive metabolic panel (BMP + " "Alb, Alk Phos, ALT, AST, Total. Bili, TP)   5. Hyperlipidemia LDL goal <100  E78.5 Lipid panel reflex to direct LDL Non-fasting     Comprehensive metabolic panel (BMP + Alb, Alk Phos, ALT, AST, Total. Bili, TP)   6. Abnormal SPEP  R77.8 Protein Immunofixation Serum   7. Encounter for Medicare annual wellness exam  Z00.00      She is here for Medicare wellness check.  She is doing okay but having some memory loss she does not admit to this.  She got 0 out of the 3 words.  We looked at this before she has a slightly abnormal SPEP and we will repeat this, we will repeat her other labs I did offer her neuropsych testing but she did not want it.    She did not remember leaving the Occupational Therapy memory evaluation in November.  I did write on her AVS asking her sister to discuss memory with her.  She will continue her medications for diabetes, lipids.  We did discuss her aspirin and she will continue that now for diabetes and hypertension.      COUNSELING:  Reviewed preventive health counseling, as reflected in patient instructions       Regular exercise       Healthy diet/nutrition    Estimated body mass index is 24.98 kg/m  as calculated from the following:    Height as of this encounter: 1.6 m (5' 3\").    Weight as of this encounter: 64 kg (141 lb).        She reports that she has never smoked. She has never used smokeless tobacco.      Appropriate preventive services were discussed with this patient, including applicable screening as appropriate for cardiovascular disease, diabetes, osteopenia/osteoporosis, and glaucoma.  As appropriate for age/gender, discussed screening for colorectal cancer, prostate cancer, breast cancer, and cervical cancer. Checklist reviewing preventive services available has been given to the patient.    Reviewed patients plan of care and provided an AVS. The Basic Care Plan (routine screening as documented in Health Maintenance) for Rylie meets the Care Plan requirement. This Care Plan " has been established and reviewed with the Patient.    Counseling Resources:  ATP IV Guidelines  Pooled Cohorts Equation Calculator  Breast Cancer Risk Calculator  Breast Cancer: Medication to Reduce Risk  FRAX Risk Assessment  ICSI Preventive Guidelines  Dietary Guidelines for Americans, 2010  USDA's MyPlate  ASA Prophylaxis  Lung CA Screening    Rene Sharma MD  [facility]    Identified Health Risks:    She is at risk for lack of exercise and has been provided with information to increase physical activity for the benefit of her well-being.  The patient was counseled and encouraged to consider modifying their diet and eating habits. She was provided with information on recommended healthy diet options.

## 2022-05-02 NOTE — LETTER
May 3, 2022      Rylie Young  [address]   [place] [address]        Dear ,    We are writing to inform you of your test results.    Labs are okay, there is one protein level that is off and I would recommend that be checked yearly.     Rene Sharma MD    Resulted Orders   Albumin Random Urine Quantitative with Creat Ratio   Result Value Ref Range    Creatinine Urine mg/dL 148 mg/dL    Albumin Urine mg/L 26 mg/L    Albumin Urine mg/g Cr 17.57 0.00 - 25.00 mg/g Cr   Lipid panel reflex to direct LDL Non-fasting   Result Value Ref Range    Cholesterol 174 <200 mg/dL    Triglycerides 121 <150 mg/dL    Direct Measure HDL 71 >=50 mg/dL    LDL Cholesterol Calculated 79 <=100 mg/dL    Non HDL Cholesterol 103 <130 mg/dL    Patient Fasting > 8hrs? No     Narrative    Cholesterol  Desirable:  <200 mg/dL    Triglycerides  Normal:  Less than 150 mg/dL  Borderline High:  150-199 mg/dL  High:  200-499 mg/dL  Very High:  Greater than or equal to 500 mg/dL    Direct Measure HDL  Female:  Greater than or equal to 50 mg/dL   Male:  Greater than or equal to 40 mg/dL    LDL Cholesterol  Desirable:  <100mg/dL  Above Desirable:  100-129 mg/dL   Borderline High:  130-159 mg/dL   High:  160-189 mg/dL   Very High:  >= 190 mg/dL    Non HDL Cholesterol  Desirable:  130 mg/dL  Above Desirable:  130-159 mg/dL  Borderline High:  160-189 mg/dL  High:  190-219 mg/dL  Very High:  Greater than or equal to 220 mg/dL   HEMOGLOBIN A1C   Result Value Ref Range    Hemoglobin A1C 6.2 (H) 0.0 - 5.6 %      Comment:      Normal <5.7%   Prediabetes 5.7-6.4%    Diabetes 6.5% or higher     Note: Adopted from ADA consensus guidelines.   Protein Immunofixation Serum   Result Value Ref Range    Immunofixation ELP       Small monoclonal IgG immunoglobulin of lambda light chain type. Pathologic significance requires clinical correlation.  DANA Bone M.D., Ph.D., Pathologist ()   Comprehensive metabolic panel (BMP +  Alb, Alk Phos, ALT, AST, Total. Bili, TP)   Result Value Ref Range    Sodium 139 133 - 144 mmol/L    Potassium 3.4 3.4 - 5.3 mmol/L    Chloride 103 94 - 109 mmol/L    Carbon Dioxide (CO2) 31 20 - 32 mmol/L    Anion Gap 5 3 - 14 mmol/L    Urea Nitrogen 23 7 - 30 mg/dL    Creatinine 0.90 0.52 - 1.04 mg/dL    Calcium 9.4 8.5 - 10.1 mg/dL    Glucose 118 (H) 70 - 99 mg/dL    Alkaline Phosphatase 57 40 - 150 U/L    AST 15 0 - 45 U/L    ALT 25 0 - 50 U/L    Protein Total 7.5 6.8 - 8.8 g/dL    Albumin 4.1 3.4 - 5.0 g/dL    Bilirubin Total 0.3 0.2 - 1.3 mg/dL    GFR Estimate 67 >60 mL/min/1.73m2      Comment:      Effective December 21, 2021 eGFRcr in adults is calculated using the 2021 CKD-EPI creatinine equation which includes age and gender (Avelino et al., NEJM, DOI: 10.1056/PLLAgj9970439)       If you have any questions or concerns, please call the clinic at the number listed above.

## 2022-05-03 LAB — PROT PATTERN SERPL IFE-IMP: NORMAL

## 2022-05-11 ENCOUNTER — TELEPHONE (OUTPATIENT)
Dept: INTERNAL MEDICINE | Facility: CLINIC | Age: 72
End: 2022-05-11
Payer: COMMERCIAL

## 2022-05-11 NOTE — TELEPHONE ENCOUNTER
"Received fax from pharmacy. Pharmacy dispensed patient exactly what we have on patient's medication list.     Called and spoke to patient. She is confused. She said Dr. Sharma asked her in her OV \"why are you not taking this medication\". Patient said she thought she was taking everything as prescribed so she has no clue what medication he is referring to. RN reviewed medication with patient. She is taking her medication as prescribed on her medication list. RN encouraged her to keep doing what she is doing. She understands and agrees with the plan of care.     Dr. Sharma is updated.     FANI Carmen, RN  [facility]    "

## 2022-05-11 NOTE — TELEPHONE ENCOUNTER
Reason for Call:  Other call back    Detailed comments: Patient called stating that when she was in for her appointment with Dr. Sharma he mentioned she had stopped taking a medication and was wondering why she had. She then called her pharmacy and they told her she is still taking it. Patient was asked what the name of the medication was and she stated she couldn't remember and so she was hoping Dr. Sharma or his nurse would and could tell her. She feels it is an important medication and she should be on it.     Phone Number Patient can be reached at: Home number on file [phone] (home)    Best Time: anby    Can we leave a detailed message on this number? YES    Call taken on 5/11/2022 at 9:16 AM by Jeannette Barone

## 2022-06-15 DIAGNOSIS — E78.5 HYPERLIPIDEMIA LDL GOAL <100: ICD-10-CM

## 2022-06-16 RX ORDER — SIMVASTATIN 40 MG
40 TABLET ORAL DAILY
Qty: 90 TABLET | Refills: 1 | Status: SHIPPED | OUTPATIENT
Start: 2022-06-16 | End: 2022-11-18

## 2022-07-22 DIAGNOSIS — I10 HYPERTENSION GOAL BP (BLOOD PRESSURE) < 140/90: ICD-10-CM

## 2022-07-25 RX ORDER — POTASSIUM CHLORIDE 750 MG/1
TABLET, EXTENDED RELEASE ORAL
Qty: 180 TABLET | Refills: 0 | OUTPATIENT
Start: 2022-07-25

## 2022-09-14 ENCOUNTER — TRANSFERRED RECORDS (OUTPATIENT)
Dept: INTERNAL MEDICINE | Facility: CLINIC | Age: 72
End: 2022-09-14

## 2022-09-14 LAB — RETINOPATHY: POSITIVE

## 2022-09-21 DIAGNOSIS — E11.9 TYPE 2 DIABETES MELLITUS WITHOUT COMPLICATION, WITHOUT LONG-TERM CURRENT USE OF INSULIN (H): ICD-10-CM

## 2022-09-21 NOTE — TELEPHONE ENCOUNTER
Patient requesting new refill for Trulicity injections. When she tried to inject this morning, the first one broke, and the second try she said it did not go in.

## 2022-09-22 ENCOUNTER — TELEPHONE (OUTPATIENT)
Dept: INTERNAL MEDICINE | Facility: CLINIC | Age: 72
End: 2022-09-22

## 2022-09-22 DIAGNOSIS — E11.9 TYPE 2 DIABETES MELLITUS WITHOUT COMPLICATION, WITHOUT LONG-TERM CURRENT USE OF INSULIN (H): ICD-10-CM

## 2022-09-22 RX ORDER — DULAGLUTIDE 0.75 MG/.5ML
0.75 INJECTION, SOLUTION SUBCUTANEOUS
Qty: 4 ML | Refills: 11 | Status: SHIPPED | OUTPATIENT
Start: 2022-09-22 | End: 2023-01-20

## 2022-09-22 NOTE — TELEPHONE ENCOUNTER
Spoke with patient and informed of note below. Patient understood.     Closing encounter.   Janet Dasilva MA

## 2022-09-22 NOTE — TELEPHONE ENCOUNTER
Reason for Call:  Other call back    Detailed comments: Patient called 9/22 states that she called yesterday and is very upset by the way that she felt the person that she talked to at the clinic seemed to brush off her concerns. Pt had 2 doses left of the trulicity, is now out of the medication as the first dose she tried yesterday did not inject. And the second when taken out of the package leaked all over her hand. She will need some type of refill, she is wondering if there is another option for this medication to be oral as this is the second time that she has had issues with medication not injecting.     Phone Number Patient can be reached at: Home number on file [phone] (home)    Best Time: Anytime     Can we leave a detailed message on this number? NO    Call taken on 9/22/2022 at 10:36 AM by Bharti Underwood

## 2022-09-22 NOTE — TELEPHONE ENCOUNTER
Please let her know I sent a refill to [facility].    She can check if oral pills of Farxiga or Jardiance are covered by insurance.

## 2022-09-27 RX ORDER — DULAGLUTIDE 0.75 MG/.5ML
INJECTION, SOLUTION SUBCUTANEOUS
Qty: 2 ML | Refills: 9 | Status: SHIPPED | OUTPATIENT
Start: 2022-09-27 | End: 2023-10-12

## 2022-09-27 NOTE — TELEPHONE ENCOUNTER
Cyndee Marquez 6 days ago     BF       Patient requesting new refill for Trulicity injections. When she tried to inject this morning, the first one broke, and the second try she said it did not go in.          Documentation

## 2022-10-17 DIAGNOSIS — I10 HYPERTENSION GOAL BP (BLOOD PRESSURE) < 140/90: ICD-10-CM

## 2022-10-19 RX ORDER — POTASSIUM CHLORIDE 750 MG/1
TABLET, EXTENDED RELEASE ORAL
Qty: 180 TABLET | Refills: 0 | Status: SHIPPED | OUTPATIENT
Start: 2022-10-19 | End: 2023-01-19

## 2022-10-24 DIAGNOSIS — I10 HYPERTENSION GOAL BP (BLOOD PRESSURE) < 140/90: ICD-10-CM

## 2022-10-26 RX ORDER — ATENOLOL 50 MG/1
TABLET ORAL
Qty: 90 TABLET | Refills: 0 | Status: SHIPPED | OUTPATIENT
Start: 2022-10-26 | End: 2023-01-26

## 2022-11-16 DIAGNOSIS — E78.5 HYPERLIPIDEMIA LDL GOAL <100: ICD-10-CM

## 2022-11-16 DIAGNOSIS — I10 HYPERTENSION GOAL BP (BLOOD PRESSURE) < 140/90: ICD-10-CM

## 2022-11-18 RX ORDER — SIMVASTATIN 40 MG
TABLET ORAL
Qty: 90 TABLET | Refills: 0 | Status: SHIPPED | OUTPATIENT
Start: 2022-11-18 | End: 2023-02-13

## 2022-11-18 RX ORDER — HYDROCHLOROTHIAZIDE 25 MG/1
TABLET ORAL
Qty: 90 TABLET | Refills: 0 | Status: SHIPPED | OUTPATIENT
Start: 2022-11-18 | End: 2023-02-22

## 2022-12-03 DIAGNOSIS — E11.9 TYPE 2 DIABETES MELLITUS WITHOUT COMPLICATION, WITHOUT LONG-TERM CURRENT USE OF INSULIN (H): ICD-10-CM

## 2022-12-05 NOTE — TELEPHONE ENCOUNTER
"Requested Prescriptions   Pending Prescriptions Disp Refills    metFORMIN (GLUCOPHAGE) 500 MG tablet [Pharmacy Med Name: METFORMIN HCL 500MG TABS] 1,080 tablet 0     Sig: TAKE TWO TABLETS BY MOUTH TWICE A DAY WITH MEALS       Biguanide Agents Failed - 12/3/2022  2:55 PM        Failed - Patient has documented A1c within the specified period of time.     If HgbA1C is 8 or greater, it needs to be on file within the past 3 months.  If less than 8, must be on file within the past 6 months.     Recent Labs   Lab Test 05/02/22  1615   A1C 6.2*             Failed - Recent (6 mo) or future (30 days) visit within the authorizing provider's specialty     Patient had office visit in the last 6 months or has a visit in the next 30 days with authorizing provider or within the authorizing provider's specialty.  See \"Patient Info\" tab in inbasket, or \"Choose Columns\" in Meds & Orders section of the refill encounter.            Passed - Patient is age 10 or older        Passed - Patient's CR is NOT>1.4 OR Patient's EGFR is NOT<45 within past 12 mos.     Recent Labs   Lab Test 05/02/22  1615 10/25/21  1445 06/17/20  1053   GFRESTIMATED 67   < > 72   GFRESTBLACK  --   --  83    < > = values in this interval not displayed.       Recent Labs   Lab Test 05/02/22  1615   CR 0.90             Passed - Patient does NOT have a diagnosis of CHF.        Passed - Medication is active on med list        Passed - Patient is not pregnant        Passed - Patient has not had a positive pregnancy test within the past 12 mos.              "

## 2022-12-17 DIAGNOSIS — E11.9 TYPE 2 DIABETES MELLITUS WITHOUT COMPLICATION, WITHOUT LONG-TERM CURRENT USE OF INSULIN (H): ICD-10-CM

## 2022-12-20 NOTE — TELEPHONE ENCOUNTER
"Requested Prescriptions   Pending Prescriptions Disp Refills    glipiZIDE (GLUCOTROL XL) 10 MG 24 hr tablet [Pharmacy Med Name: GLIPIZIDE ER 10MG TB24] 540 tablet 0     Sig: TAKE ONE TABLET BY MOUTH TWICE A DAY       Sulfonylurea Agents Failed - 12/17/2022  5:02 AM        Failed - Patient has documented A1c within the specified period of time.     If HgbA1C is 8 or greater, it needs to be on file within the past 3 months.  If less than 8, must be on file within the past 6 months.     Recent Labs   Lab Test 05/02/22  1615   A1C 6.2*             Failed - Recent (6 mo) or future (30 days) visit within the authorizing provider's specialty     Patient had office visit in the last 6 months or has a visit in the next 30 days with authorizing provider or within the authorizing provider's specialty.  See \"Patient Info\" tab in inbasket, or \"Choose Columns\" in Meds & Orders section of the refill encounter.            Passed - Medication is active on med list        Passed - Patient is age 18 or older        Passed - No active pregnancy on record        Passed - Patient has a recent creatinine (normal) within the past 12 mos.     Recent Labs   Lab Test 05/02/22  1615   CR 0.90       Ok to refill medication if creatinine is low          Passed - Patient has not had a positive pregnancy test within the past 12 mos.               "

## 2022-12-21 RX ORDER — GLIPIZIDE 10 MG/1
TABLET, FILM COATED, EXTENDED RELEASE ORAL
Qty: 540 TABLET | Refills: 0 | Status: SHIPPED | OUTPATIENT
Start: 2022-12-21 | End: 2023-02-22

## 2023-01-17 DIAGNOSIS — I10 HYPERTENSION GOAL BP (BLOOD PRESSURE) < 140/90: ICD-10-CM

## 2023-01-19 RX ORDER — POTASSIUM CHLORIDE 750 MG/1
TABLET, EXTENDED RELEASE ORAL
Qty: 180 TABLET | Refills: 0 | Status: SHIPPED | OUTPATIENT
Start: 2023-01-19 | End: 2023-02-22

## 2023-01-19 NOTE — TELEPHONE ENCOUNTER
Prescription approved per [facility] Refill Protocol.  Swetha Calles RN on 1/19/2023 at 9:26 AM

## 2023-01-20 DIAGNOSIS — E11.9 TYPE 2 DIABETES MELLITUS WITHOUT COMPLICATION, WITHOUT LONG-TERM CURRENT USE OF INSULIN (H): ICD-10-CM

## 2023-01-20 RX ORDER — DULAGLUTIDE 0.75 MG/.5ML
0.75 INJECTION, SOLUTION SUBCUTANEOUS
Qty: 6 ML | Refills: 3 | Status: SHIPPED | OUTPATIENT
Start: 2023-01-20

## 2023-01-26 DIAGNOSIS — I10 HYPERTENSION GOAL BP (BLOOD PRESSURE) < 140/90: ICD-10-CM

## 2023-01-26 RX ORDER — ATENOLOL 50 MG/1
TABLET ORAL
Qty: 90 TABLET | Refills: 1 | Status: SHIPPED | OUTPATIENT
Start: 2023-01-26 | End: 2023-07-19

## 2023-02-12 DIAGNOSIS — E78.5 HYPERLIPIDEMIA LDL GOAL <100: ICD-10-CM

## 2023-02-13 RX ORDER — SIMVASTATIN 40 MG
TABLET ORAL
Qty: 90 TABLET | Refills: 0 | Status: SHIPPED | OUTPATIENT
Start: 2023-02-13 | End: 2023-02-22

## 2023-02-13 NOTE — TELEPHONE ENCOUNTER
Prescription approved per [facility] Refill Protocol.  Swetha Calles RN on 2/13/2023 at 3:56 PM

## 2023-02-22 ENCOUNTER — PATIENT OUTREACH (OUTPATIENT)
Dept: CARE COORDINATION | Facility: CLINIC | Age: 73
End: 2023-02-22

## 2023-02-22 ENCOUNTER — OFFICE VISIT (OUTPATIENT)
Dept: INTERNAL MEDICINE | Facility: CLINIC | Age: 73
End: 2023-02-22
Payer: MEDICARE

## 2023-02-22 VITALS
WEIGHT: 141.2 LBS | HEART RATE: 80 BPM | SYSTOLIC BLOOD PRESSURE: 138 MMHG | OXYGEN SATURATION: 96 % | TEMPERATURE: 98.1 F | HEIGHT: 62 IN | BODY MASS INDEX: 25.98 KG/M2 | RESPIRATION RATE: 20 BRPM | DIASTOLIC BLOOD PRESSURE: 80 MMHG

## 2023-02-22 DIAGNOSIS — R77.8 ABNORMAL SPEP: ICD-10-CM

## 2023-02-22 DIAGNOSIS — I10 HYPERTENSION GOAL BP (BLOOD PRESSURE) < 140/90: ICD-10-CM

## 2023-02-22 DIAGNOSIS — E11.69 TYPE 2 DIABETES MELLITUS WITH OTHER SPECIFIED COMPLICATION, WITHOUT LONG-TERM CURRENT USE OF INSULIN (H): Primary | ICD-10-CM

## 2023-02-22 DIAGNOSIS — R41.3 MEMORY LOSS: ICD-10-CM

## 2023-02-22 DIAGNOSIS — E78.5 HYPERLIPIDEMIA LDL GOAL <100: ICD-10-CM

## 2023-02-22 LAB
ALBUMIN SERPL BCG-MCNC: 4.4 G/DL (ref 3.5–5.2)
ALP SERPL-CCNC: 60 U/L (ref 35–104)
ALT SERPL W P-5'-P-CCNC: 25 U/L (ref 10–35)
ANION GAP SERPL CALCULATED.3IONS-SCNC: 10 MMOL/L (ref 7–15)
AST SERPL W P-5'-P-CCNC: 20 U/L (ref 10–35)
BILIRUB SERPL-MCNC: 0.4 MG/DL
BUN SERPL-MCNC: 16.9 MG/DL (ref 8–23)
CALCIUM SERPL-MCNC: 10.1 MG/DL (ref 8.8–10.2)
CHLORIDE SERPL-SCNC: 101 MMOL/L (ref 98–107)
CHOLEST SERPL-MCNC: 167 MG/DL
CREAT SERPL-MCNC: 0.86 MG/DL (ref 0.51–0.95)
CREAT UR-MCNC: 86.2 MG/DL
DEPRECATED HCO3 PLAS-SCNC: 29 MMOL/L (ref 22–29)
GFR SERPL CREATININE-BSD FRML MDRD: 70 ML/MIN/1.73M2
GLUCOSE SERPL-MCNC: 123 MG/DL (ref 70–99)
HBA1C MFR BLD: 6.6 %
HDLC SERPL-MCNC: 73 MG/DL
LDLC SERPL CALC-MCNC: 80 MG/DL
MICROALBUMIN UR-MCNC: <12 MG/L
MICROALBUMIN/CREAT UR: NORMAL MG/G{CREAT}
NONHDLC SERPL-MCNC: 94 MG/DL
POTASSIUM SERPL-SCNC: 3.8 MMOL/L (ref 3.4–5.3)
PROT SERPL-MCNC: 7.2 G/DL (ref 6.4–8.3)
SODIUM SERPL-SCNC: 140 MMOL/L (ref 136–145)
TOTAL PROTEIN SERUM FOR ELP: 6.8 G/DL (ref 6.4–8.3)
TRIGL SERPL-MCNC: 71 MG/DL
TSH SERPL DL<=0.005 MIU/L-ACNC: 0.84 UIU/ML (ref 0.3–4.2)

## 2023-02-22 PROCEDURE — 80053 COMPREHEN METABOLIC PANEL: CPT | Performed by: INTERNAL MEDICINE

## 2023-02-22 PROCEDURE — 86335 IMMUNFIX E-PHORSIS/URINE/CSF: CPT

## 2023-02-22 PROCEDURE — 86334 IMMUNOFIX E-PHORESIS SERUM: CPT

## 2023-02-22 PROCEDURE — 82043 UR ALBUMIN QUANTITATIVE: CPT | Performed by: INTERNAL MEDICINE

## 2023-02-22 PROCEDURE — 84165 PROTEIN E-PHORESIS SERUM: CPT

## 2023-02-22 PROCEDURE — 36415 COLL VENOUS BLD VENIPUNCTURE: CPT | Performed by: INTERNAL MEDICINE

## 2023-02-22 PROCEDURE — 82570 ASSAY OF URINE CREATININE: CPT | Performed by: INTERNAL MEDICINE

## 2023-02-22 PROCEDURE — 99214 OFFICE O/P EST MOD 30 MIN: CPT | Performed by: INTERNAL MEDICINE

## 2023-02-22 PROCEDURE — 80061 LIPID PANEL: CPT | Performed by: INTERNAL MEDICINE

## 2023-02-22 PROCEDURE — 84443 ASSAY THYROID STIM HORMONE: CPT | Performed by: INTERNAL MEDICINE

## 2023-02-22 PROCEDURE — 83036 HEMOGLOBIN GLYCOSYLATED A1C: CPT | Performed by: INTERNAL MEDICINE

## 2023-02-22 RX ORDER — SIMVASTATIN 40 MG
40 TABLET ORAL DAILY
Qty: 90 TABLET | Refills: 3 | Status: SHIPPED | OUTPATIENT
Start: 2023-02-22

## 2023-02-22 RX ORDER — HYDROCHLOROTHIAZIDE 25 MG/1
TABLET ORAL
Qty: 90 TABLET | Refills: 3 | Status: SHIPPED | OUTPATIENT
Start: 2023-02-22

## 2023-02-22 RX ORDER — GLIPIZIDE 10 MG/1
20 TABLET, FILM COATED, EXTENDED RELEASE ORAL DAILY
Qty: 180 TABLET | Refills: 3 | Status: SHIPPED | OUTPATIENT
Start: 2023-02-22

## 2023-02-22 RX ORDER — POTASSIUM CHLORIDE 750 MG/1
20 TABLET, EXTENDED RELEASE ORAL DAILY
Qty: 180 TABLET | Refills: 0 | Status: SHIPPED | OUTPATIENT
Start: 2023-02-22 | End: 2023-07-19

## 2023-02-22 SDOH — ECONOMIC STABILITY: INCOME INSECURITY: IN THE LAST 12 MONTHS, WAS THERE A TIME WHEN YOU WERE NOT ABLE TO PAY THE MORTGAGE OR RENT ON TIME?: NO

## 2023-02-22 SDOH — ECONOMIC STABILITY: FOOD INSECURITY: WITHIN THE PAST 12 MONTHS, THE FOOD YOU BOUGHT JUST DIDN'T LAST AND YOU DIDN'T HAVE MONEY TO GET MORE.: NEVER TRUE

## 2023-02-22 SDOH — ECONOMIC STABILITY: TRANSPORTATION INSECURITY
IN THE PAST 12 MONTHS, HAS THE LACK OF TRANSPORTATION KEPT YOU FROM MEDICAL APPOINTMENTS OR FROM GETTING MEDICATIONS?: NO

## 2023-02-22 SDOH — HEALTH STABILITY: PHYSICAL HEALTH: ON AVERAGE, HOW MANY DAYS PER WEEK DO YOU ENGAGE IN MODERATE TO STRENUOUS EXERCISE (LIKE A BRISK WALK)?: 0 DAYS

## 2023-02-22 SDOH — ECONOMIC STABILITY: TRANSPORTATION INSECURITY
IN THE PAST 12 MONTHS, HAS LACK OF TRANSPORTATION KEPT YOU FROM MEETINGS, WORK, OR FROM GETTING THINGS NEEDED FOR DAILY LIVING?: NO

## 2023-02-22 SDOH — HEALTH STABILITY: PHYSICAL HEALTH: ON AVERAGE, HOW MANY MINUTES DO YOU ENGAGE IN EXERCISE AT THIS LEVEL?: 0 MIN

## 2023-02-22 SDOH — ECONOMIC STABILITY: FOOD INSECURITY: WITHIN THE PAST 12 MONTHS, YOU WORRIED THAT YOUR FOOD WOULD RUN OUT BEFORE YOU GOT MONEY TO BUY MORE.: NEVER TRUE

## 2023-02-22 ASSESSMENT — LIFESTYLE VARIABLES
HOW OFTEN DO YOU HAVE SIX OR MORE DRINKS ON ONE OCCASION: NEVER
HOW OFTEN DO YOU HAVE A DRINK CONTAINING ALCOHOL: NEVER
AUDIT-C TOTAL SCORE: 0
HOW MANY STANDARD DRINKS CONTAINING ALCOHOL DO YOU HAVE ON A TYPICAL DAY: PATIENT DOES NOT DRINK
SKIP TO QUESTIONS 9-10: 1

## 2023-02-22 ASSESSMENT — ACTIVITIES OF DAILY LIVING (ADL): DEPENDENT_IADLS:: INDEPENDENT

## 2023-02-22 ASSESSMENT — SOCIAL DETERMINANTS OF HEALTH (SDOH)
HOW HARD IS IT FOR YOU TO PAY FOR THE VERY BASICS LIKE FOOD, HOUSING, MEDICAL CARE, AND HEATING?: NOT HARD AT ALL
DO YOU BELONG TO ANY CLUBS OR ORGANIZATIONS SUCH AS CHURCH GROUPS UNIONS, FRATERNAL OR ATHLETIC GROUPS, OR SCHOOL GROUPS?: YES
IN A TYPICAL WEEK, HOW MANY TIMES DO YOU TALK ON THE PHONE WITH FAMILY, FRIENDS, OR NEIGHBORS?: MORE THAN THREE TIMES A WEEK
HOW OFTEN DO YOU ATTENT MEETINGS OF THE CLUB OR ORGANIZATION YOU BELONG TO?: MORE THAN 4 TIMES PER YEAR
HOW OFTEN DO YOU ATTEND CHURCH OR RELIGIOUS SERVICES?: MORE THAN 4 TIMES PER YEAR
HOW OFTEN DO YOU GET TOGETHER WITH FRIENDS OR RELATIVES?: MORE THAN THREE TIMES A WEEK

## 2023-02-22 ASSESSMENT — PAIN SCALES - GENERAL: PAINLEVEL: NO PAIN (0)

## 2023-02-22 NOTE — PROGRESS NOTES
Clinic Care Coordination Contact    OUTREACH    Referral Information:  Referral Source: PCP    Chief Complaint   Patient presents with     Clinic Care Coordination - Initial     RN CC- Initial        Universal Utilization: 16% Admission or ED Risk  Clinic Utilization  Difficulty keeping appointments:: No  Compliance Concerns: No  No-Show Concerns: No  No PCP office visit in Past Year: No  Utilization    Hospital Admissions  0             ED Visits  0             No Show Count (past year)  1                Current as of: 2/22/2023  1:18 AM            Clinical Concerns:    Current Medical Concerns:  RN CC met with patient and son after her appointment with Dr. Sharma. Patient is currently living on her own in a condo. Patient said she enjoys living here and does not want to move yet. She said she is able to easily access the pharmacy, grocery store, her Religious, and everything she needs. Patient, son, and provider do note some memory issues. Patient said she also has issues with getting medications and keeping that all straight. Patient is independent with most everything. She has her son and daughter in law who are around and help her a lot. Provider also noted patient needs reminder to eat sometimes. Patient said she is not very active in the winter months but is active in the spring and summer. Patient has a lot of support around her with her Religious, friends and family. Patient signed a consent to communicate today for son and daughter in law. Provider notes he changed the patient's medications to once daily so it will hopefully be easier to manage.     Current Behavioral Concerns: None noted.       Education Provided to patient:  Advised patient to call clinic with any urgent needs or urgent symptoms. Advised patient to call pharmacy with any refill needs. Advised patient to call RN CC with any non urgent needs.      Pain  Pain (GOAL):: No  Health Maintenance Reviewed: Due/Overdue   Health Maintenance Due   Topic  Date Due     DEXA  Never done     HEPATITIS C SCREENING  Never done     ZOSTER IMMUNIZATION (1 of 2) Never done     DIABETIC FOOT EXAM  02/06/2014     DTAP/TDAP/TD IMMUNIZATION (3 - Td or Tdap) 02/17/2022     COVID-19 Vaccine (4 - Booster for Moderna series) 03/23/2022     INFLUENZA VACCINE (1) 09/01/2022       Clinical Pathway: None    Medication Management:  Medication review status: Medications reviewed and no changes reported per patient.           Functional Status:  Dependent ADLs:: Independent  Dependent IADLs:: Independent  Bed or wheelchair confined:: No  Mobility Status: Independent  Fallen 2 or more times in the past year?: No  Any fall with injury in the past year?: No    Living Situation:  Current living arrangement:: I live alone (in a condo)  Type of residence:: Private home - no stairs    Lifestyle & Psychosocial Needs:    Social Determinants of Health     Tobacco Use: Low Risk      Smoking Tobacco Use: Never     Smokeless Tobacco Use: Never     Passive Exposure: Not on file   Alcohol Use: Not At Risk     Frequency of Alcohol Consumption: Never     Average Number of Drinks: Patient does not drink     Frequency of Binge Drinking: Never   Financial Resource Strain: Low Risk      Difficulty of Paying Living Expenses: Not hard at all   Food Insecurity: No Food Insecurity     Worried About Running Out of Food in the Last Year: Never true     Ran Out of Food in the Last Year: Never true   Transportation Needs: No Transportation Needs     Lack of Transportation (Medical): No     Lack of Transportation (Non-Medical): No   Physical Activity: Inactive     Days of Exercise per Week: 0 days     Minutes of Exercise per Session: 0 min   Stress: No Stress Concern Present     Feeling of Stress : Not at all   Social Connections: Unknown     Frequency of Communication with Friends and Family: More than three times a week     Frequency of Social Gatherings with Friends and Family: More than three times a week      Attends Anabaptism Services: More than 4 times per year     Active Member of Clubs or Organizations: Yes     Attends Club or Organization Meetings: More than 4 times per year     Marital Status: Not on file   Intimate Partner Violence: Not on file   Depression: Not at risk     PHQ-2 Score: 0   Housing Stability: Unknown     Unable to Pay for Housing in the Last Year: No     Number of Places Lived in the Last Year: Not on file     Unstable Housing in the Last Year: No     Diet:: Diabetic diet  Inadequate nutrition (GOAL):: No  Tube Feeding: No  Inadequate activity/exercise (GOAL):: No  Significant changes in sleep pattern (GOAL): No  Transportation means:: Regular car     Mental health DX:: No  Mental health management concern (GOAL):: No  Chemical Dependency Status: No Current Concerns  Informal Support system:: Children, Holly based, Friends, Neighbors      Resources and Interventions:  Current Resources:      Community Resources: None  Supplies Currently Used at Home: None  Equipment Currently Used at Home: none  Employment Status: retired      Advance Care Plan/Directive  Advanced Care Plans/Directives on file:: No  Advanced Care Plan/Directive Status: Considering Options    Referrals Placed: None     Care Plan:  Care Plan: Medications     Problem: Medication Compliance     Goal: I will take the right medication at the right time, the right dose, and the right route daily.     Start Date: 2/22/2023 Expected End Date: 8/21/2023    This Visit's Progress: 0%    Note:     Barriers: memory issues  Strengths: motivated and engaged  Patient expressed understanding of goal: yes  Action steps to achieve this goal:  1. I will have my medication list available when taking medications  2. I will call with any questions or if I miss any medications  3. I will follow up with my doctor regularly   4. I will call my RN CC with any questions or concerns                        Patient/Caregiver understanding: Patient/caregiver  verbalized understanding and denies any additional questions or concerns at this time. RNCC engaged in [employer]ID[employer] communications during encounter.     Outreach Frequency: monthly      Plan: Patient enrolled in care coordination today. She set goals around medication management. Meal reminders are also on the radar as a possible goal. Consent to communicate as signed in clinic today for son and daughter in law. Introduction letter and complex care plan done and sent with patient. RN CC will reach out in 2 weeks to check in with patient. Son and patient have RN CC's contact information in case they need anything in the meantime.     Kat Roche, AYSHA Care Coordination   [facility]  Email: [email or web address]  Phone: [phone]

## 2023-02-22 NOTE — COMMUNITY RESOURCES LIST (ENGLISH)
02/22/2023   [facility] - Outpatient Clinics  N/A  For questions about this resource list or additional care needs, please contact your primary care clinic or care manager.  Phone: [phone]   Email: N/A   Address: [address] [place] [address]   Hours: N/A        Exercise and Recreation       Gym or workout facility  1  [employer] [facility] [place] Distance: 9.13 miles      In-Person   [address] [facility] [address] [place] [address]  Language: English  Hours: Mon - Sun Open 24 Hours  Fees: Self Pay   Phone: (894) 783-5813 Email: [email or web address] Website: [email or web address]     2  [employer] [facility] Distance: 16.39 miles      In-Person   [address] [facility] [address] [place] [address]  Language: English  Hours: Mon - Sun Open 24 Hours  Fees: Self Pay   Phone: (730) 510-5621 Email: tetomn@[email or web address] Website: [email or web address]/gyms/267/detntkqxm-[email or web address]/          Important Numbers & Websites       Emergency Services   911  [facility] Services   311  Poison Control   (117) 471-3818  Suicide Prevention Lifeline   (634) 830-4642 (TALK)  Child Abuse Hotline   (736) 733-1476 (4-A-Child)  Sexual Assault Hotline   (490) 533-1931 (HOPE)  National Runaway Safeline   (981) 122-1950 (RUNAWAY)  All-Options Talkline   (844) 369-3713  Substance Abuse Referral   (155) 348-3244 (HELP)

## 2023-02-22 NOTE — LETTER
[facility]  Patient Centered Plan of Care  About Me:        Patient Name:  Rylie Young    YOB: 1950  Age:         72 year old   [facility] MRN:    [id number] Telephone Information:  Home Phone [phone]   Mobile [phone]       Address:  [address]  [place] [address] Email address:  No e-mail address on record      Emergency Contact(s)    Name Relationship Lgl Grd Work Phone Home Phone Mobile Phone   1. JOEY PETER Sister   [phone]    2. TRAVIS BARRETT Son   [phone]            Primary language:  English     needed? No   [facility] [employer] Services:  [phone] op. 1  Other communication barriers:Glasses    Preferred Method of Communication:  Phone  Current living arrangement: I live alone (in a condo)    Mobility Status/ Medical Equipment: Independent        Health Maintenance  Health Maintenance Reviewed: Due/Overdue   Health Maintenance Due   Topic Date Due     DEXA  Never done     HEPATITIS C SCREENING  Never done     ZOSTER IMMUNIZATION (1 of 2) Never done     DIABETIC FOOT EXAM  02/06/2014     DTAP/TDAP/TD IMMUNIZATION (3 - Td or Tdap) 02/17/2022     COVID-19 Vaccine (4 - Booster for Moderna series) 03/23/2022     INFLUENZA VACCINE (1) 09/01/2022           My Access Plan  Medical Emergency 911   Primary Clinic Line [facility] - [phone]   24 Hour Appointment Line [phone] or  [address] [place] [address] [phone]) (toll-free)   24 Hour Nurse Line [phone] (toll-free)   Preferred Urgent Care No data recorded   [facility]  [phone]     Preferred Pharmacy [facility] [place]     Behavioral Health Crisis Line The National Suicide Prevention Lifeline at 1-599.941.3007 or Text/Call 678             My Care Team Members  Patient Care Team       Relationship Specialty Notifications Start End    Rene Sharma MD PCP - General Internal Medicine   5/21/12     Phone: [phone] Fax: [phone] [address] [facility] [address]    Rene Sharma MD Assigned PCP   10/4/12     Phone: [phone] Fax: [phone] [address] [facility] [address]    Kat Roche, RN Clinic Care Coordinator  Admissions 2/22/23             My Care Plans  Self Management and Treatment Plan  Care Plan  Care Plan: Medications     Problem: Medication Compliance     Goal: I will take the right medication at the right time, the right dose, and the right route daily.     Start Date: 2/22/2023 Expected End Date: 8/21/2023    This Visit's Progress: 0%    Note:     Barriers: memory issues  Strengths: motivated and engaged  Patient expressed understanding of goal: yes  Action steps to achieve this goal:  1. I will have my medication list available when taking medications  2. I will call with any questions or if I miss any medications  3. I will follow up with my doctor regularly   4. I will call my RN CC with any questions or concerns                         Action Plans on File:                       Advance Care Plans/Directives Type:   No data recorded    My Medical and Care Information  Problem List   Patient Active Problem List   Diagnosis     Edema     HYPERLIPIDEMIA LDL GOAL <100     Hypertension goal BP (blood pressure) < 140/90     Advanced directives, counseling/discussion     Type 2 diabetes mellitus with other specified complication, without long-term current use of insulin (H)      Current Medications and Allergies:    Allergies   Allergen Reactions     Jardiance [Empagliflozin] Other (See Comments)     Yeast infections     Current Outpatient Medications   Medication     aspirin 81 MG tablet     aspirin-acetaminophen-caffeine (EXCEDRIN MIGRAINE) 250-250-65 MG tablet     atenolol (TENORMIN) 50 MG tablet     blood glucose (ACCU-CHEK CHUCHO PLUS) test strip     blood glucose (NO BRAND SPECIFIED) test strip     blood glucose monitoring (ACCU-CHEK  CHUCHO PLUS) meter device kit     blood glucose monitoring (SOFTCLIX) lancets     clobetasol (TEMOVATE) 0.05 % external ointment     dulaglutide (TRULICITY) 0.75 MG/0.5ML pen     glipiZIDE (GLUCOTROL XL) 10 MG 24 hr tablet     hydrochlorothiazide (HYDRODIURIL) 25 MG tablet     ibuprofen (ADVIL/MOTRIN) 200 MG capsule     meclizine (ANTIVERT) 25 MG tablet     metFORMIN (GLUCOPHAGE) 500 MG tablet     MULTIVITAMINS OR TABS     potassium chloride ER (KLOR-CON M) 10 MEQ CR tablet     simvastatin (ZOCOR) 40 MG tablet     TRULICITY 0.75 MG/0.5ML pen     No current facility-administered medications for this visit.         Care Coordination Start Date: 2/22/2023   Frequency of Care Coordination: monthly     Form Last Updated: 02/22/2023

## 2023-02-22 NOTE — COMMUNITY RESOURCES LIST (ENGLISH)
02/22/2023   [facility] - Outpatient Clinics  N/A  For questions about this resource list or additional care needs, please contact your primary care clinic or care manager.  Phone: [phone]   Email: N/A   Address: [address] [place] [address]   Hours: N/A        Exercise and Recreation       Gym or workout facility  1  [employer] [facility] [place] Distance: 9.13 miles      In-Person   [address] [facility] [address] [place] [address]  Language: English  Hours: Mon - Sun Open 24 Hours  Fees: Self Pay   Phone: (342) 517-5862 Email: [email or web address]@[employer] Website: [email or web address]://[email or web address]/qa-[email or web address]-[email or web address]/     2  [employer] [facility] Distance: 16.39 miles      In-Person   [address] [facility] [address] [place] [address]  Language: English  Hours: Mon - Sun Open 24 Hours  Fees: Self Pay   Phone: (230) 995-8875 Email: [email or web address]@[employer] Website: https://[email or web address] Hydro/gyms/267/caziympdz-jz-04035/          Important Numbers & Websites       Emergency Services   911  [facility] Services   311  Poison Control   (855) 828-1373  Suicide Prevention Lifeline   (542) 552-7377 (TALK)  Child Abuse Hotline   (354) 413-8133 (4-A-Child)  Sexual Assault Hotline   (782) 106-4144 (HOPE)  National Runaway Safeline   (739) 832-7177 (RUNAWAY)  All-Options Talkline   (947) 827-9620  Substance Abuse Referral   (944) 337-1611 (HELP)

## 2023-02-22 NOTE — COMMUNITY RESOURCES LIST (ENGLISH)
02/22/2023   [facility] - Outpatient Clinics  N/A  For questions about this resource list or additional care needs, please contact your primary care clinic or care manager.  Phone: [phone]   Email: N/A   Address: [address] [place] [address]   Hours: N/A        Exercise and Recreation       Gym or workout facility  1  [employer] [facility] [place] Distance: 9.13 miles      In-Person   [address] [facility] [address] [place] [address]  Language: English  Hours: Mon - Sun Open 24 Hours  Fees: Self Pay   Phone: (948) 490-3344 Email: [email or web address] Website: [email or web address]://[email or web address]/[email or web address]/[email or web address]-[email or web address]/     2  [employer] [facility] Distance: 16.39 miles      In-Person   [address] [facility] [address] [place] [address]  Language: English  Hours: Mon - Sun Open 24 Hours  Fees: Self Pay   Phone: (508) 160-9691 Email: [email or web address]@[email or web address] Website: https://wwwKoinify/gyms/267/kptbqsfqy-vx-10752/          Important Numbers & Websites       Emergency Services   911  [facility] Services   311  Poison Control   (991) 630-3756  Suicide Prevention Lifeline   (962) 936-5732 (TALK)  Child Abuse Hotline   (889) 461-6365 (4-A-Child)  Sexual Assault Hotline   (801) 532-2239 (HOPE)  National Runaway Safeline   (552) 778-4490 (RUNAWAY)  All-Options Talkline   (660) 172-3370  Substance Abuse Referral   (458) 638-3741 (HELP)

## 2023-02-22 NOTE — LETTER
[facility] CARE COORDINATION  [address] [facility] [address]    February 22, 2023    Rylie Young  [address]   [facility] [place] [address]      Dear Rylie,      I am a clinic care coordinator who works with Rene Sharma MD with the [facility]. I wanted to thank you for spending the time to talk with me.  Below is a description of clinic care coordination and how I can further assist you.       The clinic care coordination team is made up of a registered nurse, , financial resource worker and community health worker who understand the health care system. The goal of clinic care coordination is to help you manage your health and improve access to the health care system. Our team works alongside your provider to assist you in determining your health and social needs. We can help you obtain health care and community resources, providing you with necessary information and education. We can work with you through any barriers and develop a care plan that helps coordinate and strengthen the communication between you and your care team.    Please feel free to contact me with any questions or concerns regarding care coordination and what we can offer.      We are focused on providing you with the highest-quality healthcare experience possible.    Sincerely,     Kat Roche RN Care Coordination   [facility]-  [place], [place], [place]  Phone: [phone]

## 2023-02-22 NOTE — PROGRESS NOTES
Assessment & Plan     Type 2 diabetes mellitus with other specified complication, without long-term current use of insulin (H)  Diabetes is doing okay on Trulicity, metformin, glipizide.  We will change her glipizide and metformin to once a day, reducing the metformin for ease of use.  We will check her A1c it has been very good.  - Comprehensive metabolic panel (BMP + Alb, Alk Phos, ALT, AST, Total. Bili, TP); Future  - TSH with free T4 reflex; Future  - Hemoglobin A1c; Future  - Albumin Random Urine Quantitative with Creat Ratio; Future  - Lipid panel reflex to direct LDL Fasting; Future    Hyperlipidemia LDL goal <100  Lipids continue simvastatin check a fasting lipid panel today.  - Lipid panel reflex to direct LDL Fasting; Future  - simvastatin (ZOCOR) 40 MG tablet; Take 1 tablet (40 mg) by mouth daily    Hypertension goal BP (blood pressure) < 140/90  Blood pressure is nicely controlled continue her hydrochlorothiazide and potassium replacement  - Comprehensive metabolic panel (BMP + Alb, Alk Phos, ALT, AST, Total. Bili, TP); Future  - Albumin Random Urine Quantitative with Creat Ratio; Future  - potassium chloride ER (KLOR-CON M) 10 MEQ CR tablet; Take 2 tablets (20 mEq) by mouth daily  - hydrochlorothiazide (HYDRODIURIL) 25 MG tablet; TAKE ONE TABLET BY MOUTH EVERY MORNING    Abnormal SPEP  Abnormal SPEP in the past and has been stable we will check immunofixation and may need oncology referral.  She is having more memory issues unclear if this is affecting it.  - Protein electrophoresis; Future  - Protein Immunofixation Serum; Future  - Protein immunofixation urine; Future    Memory loss  Memory loss discussed her CT scan showing some small vessel disease complicated by her diabetes blood pressure and lipids.  She is medically managed.  No current reason to do an MRI as she is already on the appropriate treatment.  Offered neuropsych testing and MRI testing but the son is okay with not doing that.  We  "will have care coordination talk to them.  Recommended driving cessation due to her memory.  They are working on making sure her pills are taken and we will do those once a day, son will come over when she gets her Trulicity weekly and take her grocery shopping.  - Primary Care - Care Coordination Referral; Future  - Protein Immunofixation Serum; Future  - Protein immunofixation urine; Future             BMI:   Estimated body mass index is 25.62 kg/m  as calculated from the following:    Height as of this encounter: 1.581 m (5' 2.25\").    Weight as of this encounter: 64 kg (141 lb 3.2 oz).           No follow-ups on file.    Rene Sharma MD  [facility] ERROL Youngblood is a 72 year old accompanied by her son, presenting for the following health issues:  Memory Loss    History of Present Illness       Reason for visit:  Memory loss diabicte check up    She eats 2-3 servings of fruits and vegetables daily.She consumes 0 sweetened beverage(s) daily.She exercises with enough effort to increase her heart rate 9 or less minutes per day.  She exercises with enough effort to increase her heart rate 3 or less days per week. She is missing 6 dose(s) of medications per week.     She is living in a condo, not driving much.    Forgets her medications at times.  No side effects.      Checks sugars when she remembers, other day with her son was 248 after eating.     Review of Systems         Objective    /80 (BP Location: Left arm, Patient Position: Chair)   Pulse 80   Temp 98.1  F (36.7  C) (Temporal)   Resp 20   Ht 1.581 m (5' 2.25\")   Wt 64 kg (141 lb 3.2 oz)   SpO2 96%   BMI 25.62 kg/m    Body mass index is 25.62 kg/m .  Physical Exam   No acute distress  Memory is poor and she knows this  Heart regular lungs are clear  Extremities without any edema.                    "

## 2023-02-23 LAB
ALBUMIN SERPL ELPH-MCNC: 4.4 G/DL (ref 3.7–5.1)
ALPHA1 GLOB SERPL ELPH-MCNC: 0.2 G/DL (ref 0.2–0.4)
ALPHA2 GLOB SERPL ELPH-MCNC: 0.7 G/DL (ref 0.5–0.9)
B-GLOBULIN SERPL ELPH-MCNC: 0.7 G/DL (ref 0.6–1)
GAMMA GLOB SERPL ELPH-MCNC: 0.8 G/DL (ref 0.7–1.6)
M PROTEIN SERPL ELPH-MCNC: 0.1 G/DL
PROT ELPH PNL UR ELPH: NORMAL
PROT PATTERN SERPL ELPH-IMP: ABNORMAL
PROT PATTERN SERPL IFE-IMP: NORMAL

## 2023-03-02 ENCOUNTER — PATIENT OUTREACH (OUTPATIENT)
Dept: CARE COORDINATION | Facility: CLINIC | Age: 73
End: 2023-03-02
Payer: COMMERCIAL

## 2023-03-02 NOTE — PROGRESS NOTES
Clinic Care Coordination Contact  [facility]/Voicemail    RN CC received a voicemail from patient's son Jair, asking for a call back.      Clinical Data: Care Coordinator Outreach  Outreach attempted x 1.  Left message on Jair's voicemail with call back information and requested return call.  Plan: Care Coordinator sent care coordination introduction letter on 2/22/2023 via mail. Care Coordinator will try to reach patient again in 3-5 business days.    Kta Roche RN Care Coordination   [facility]  Email: [email or web address]  Phone: [phone]

## 2023-03-02 NOTE — LETTER
[facility]  [address] [facility] [address]  Phone: [phone]      March 29, 2023      Rylie Young  [address]   [place] [address]    Dear Rylie,    We have been trying to reach you to introduce you to [facility] s Care Coordination program.  The goal of care coordination is to help you manage your health and improve access to the [facility] system in the most efficient manner.  The Care Coordinator is a nurse who understands the healthcare system and will assist you in improving your access to care.     As your Physician and Care Coordinator we partner to help you achieve your health care goals.     We will continue to reach out; however, if you are able to call your Care Coordinator at [phone], that would be appreciated.  We at [facility] are focused on providing you with the highest-quality healthcare experience possible.      It is a pleasure to partner with you as we work towards achieving your optimal state of wellness.        Sincerely,      AYSHA Kim Paul D None   [address] [address]

## 2023-03-07 NOTE — PROGRESS NOTES
Clinic Care Coordination Contact  [facility]/Voicemail       Clinical Data: Care Coordinator Outreach  Outreach attempted x 2.  Left message on son Jair's voicemail with call back information and requested return call.  Plan: Care Coordinator sent care coordination introduction letter on 2/22/2023 via mail. Care Coordinator will try to reach patient again in 3-5 business days.    Kat Roche RN Care Coordination   [facility]  Email: [email or web address]  Phone: [phone]

## 2023-03-15 NOTE — PROGRESS NOTES
Clinic Care Coordination Contact  [facility]    Son did not return RN CC's call. RN CC will do normal scheduled outreach to patient.      Clinical Data: Care Coordinator Outreach  Outreach attempted x 1.  Left message on patient's voicemail with call back information and requested return call.  Plan: Care Coordinator sent care coordination introduction letter on 2/22/2023 via mail. Care Coordinator will try to reach patient again in 10 business days.    Kat Roche RN Care Coordination   [facility] Claude Jennings  Email: [email or web address]  Phone: [phone]

## 2023-03-17 ENCOUNTER — TELEPHONE (OUTPATIENT)
Dept: INTERNAL MEDICINE | Facility: CLINIC | Age: 73
End: 2023-03-17
Payer: COMMERCIAL

## 2023-03-17 DIAGNOSIS — R41.3 MEMORY LOSS: Primary | ICD-10-CM

## 2023-03-17 NOTE — TELEPHONE ENCOUNTER
Order/Referral Request    Who is requesting: son for patient    Orders being requested: Neuropsychological testing, question lab for B12 check    Reason service is needed/diagnosis: memory issues    When are orders needed by:     Has this been discussed with Provider: Yes    Does patient have a preference on a Group/Provider/Facility? [place], [place], or [place]    Does patient have an appointment scheduled?: No, was seen on 2/22/23 were referral for further testing was offered    Where to send orders: N/A    Okay to leave a detailed message?: Yes at Other phone number:  Jair Cobb [phone]

## 2023-03-20 NOTE — TELEPHONE ENCOUNTER
New Neurophych order with Martha listed placed and pended. Please sign if appropriate and route back to me.    Son notified.

## 2023-03-20 NOTE — TELEPHONE ENCOUNTER
Please let him know we have checked the b12 before but can do it again.  Also did neuropsych referral, please help set up with Martha.

## 2023-03-28 NOTE — PROGRESS NOTES
Clinic Care Coordination Contact  [facility]/Voicemail       Clinical Data: Care Coordinator Outreach  Outreach attempted x 2.  Left message on patient's voicemail with call back information and requested return call.  Plan: Care Coordinator will send unable to contact letter with care coordinator contact information via mail. Care Coordinator will try to reach patient again in 1 month. RN CC will attempt to call son in 1 month if no return call from patient.     Kat Roche RN Care Coordination   [facility]  Email: [email or web address]  Phone: [phone]

## 2023-04-21 ENCOUNTER — TELEPHONE (OUTPATIENT)
Dept: NEUROPSYCHOLOGY | Facility: CLINIC | Age: 73
End: 2023-04-21
Payer: COMMERCIAL

## 2023-04-21 NOTE — TELEPHONE ENCOUNTER
Left Voicemail (1st Attempt) for the patient to call back and schedule the following:    Appointment type: Neuropsychology Eval referral   Provider: none  Return date: none  Specialty phone number: [phone]  Additional appointment(s) needed: none  Additonal Notes: none

## 2023-04-25 ENCOUNTER — TELEPHONE (OUTPATIENT)
Dept: NEUROLOGY | Facility: CLINIC | Age: 73
End: 2023-04-25
Payer: COMMERCIAL

## 2023-04-25 NOTE — TELEPHONE ENCOUNTER
I have attempted to contact this patient by phone with the following results:  left message to return my call on answering machine.    Called to schedule for Neuropsychology Evaluation Referral.    LVM x2, No MyC 4/25/23 NP

## 2023-05-04 ENCOUNTER — PATIENT OUTREACH (OUTPATIENT)
Dept: CARE COORDINATION | Facility: CLINIC | Age: 73
End: 2023-05-04
Payer: COMMERCIAL

## 2023-05-04 ASSESSMENT — ACTIVITIES OF DAILY LIVING (ADL): DEPENDENT_IADLS:: INDEPENDENT

## 2023-05-04 NOTE — PROGRESS NOTES
Clinic Care Coordination Contact    Follow Up Progress Note      Assessment: RN CC called and spoke to patient's son Jair (consent to communicate on file). Patient is due for goal check in and follow up. Jair reports things are going OK. He said the patient's memory is getting worse so he is waiting to hear about that appointment. RN CC can see they have been trying to reach out to schedule. RN CC gave Jair the number to call and schedule. Jair said at this time, they just need that completed. He will reach out if something else comes up.     Care Gaps:    Health Maintenance Due   Topic Date Due     DEXA  Never done     HEPATITIS C SCREENING  Never done     ZOSTER IMMUNIZATION (1 of 2) Never done     DIABETIC FOOT EXAM  02/06/2014     DTAP/TDAP/TD IMMUNIZATION (3 - Td or Tdap) 02/17/2022     COVID-19 Vaccine (4 - Booster for Moderna series) 03/23/2022     INFLUENZA VACCINE (1) 09/01/2022     MEDICARE ANNUAL WELLNESS VISIT  05/02/2023       Postponed to next PCP visit     Care Plans  Care Plan: Medications     Problem: Medication Compliance     Goal: I will take the right medication at the right time, the right dose, and the right route daily.     Start Date: 2/22/2023 Expected End Date: 8/21/2023    This Visit's Progress: 10% Recent Progress: 0%    Note:     Barriers: memory issues  Strengths: motivated and engaged  Patient expressed understanding of goal: yes  Action steps to achieve this goal:  1. I will have my medication list available when taking medications  2. I will call with any questions or if I miss any medications  3. I will follow up with my doctor regularly   4. I will call my RN CC with any questions or concerns                        Intervention/Education provided during outreach: As above. CC role, goal(s), clinic after hours, appointments, discussed/reviewed. Support provided.     Outreach Frequency: monthly    Plan:   1. Patient will continue to take all medications as prescribed.   2. Patient's son  will call neuropsych for appt.   3. Patient/caregiver will call RN CC with questions, concerns, support needs. RN CC will be available as needed.     Care Coordinator will follow up in 1 month.     Kat Roche RN Care Coordination   [facility]  Email: [email or web address]  Phone: [phone]

## 2023-06-01 ENCOUNTER — PATIENT OUTREACH (OUTPATIENT)
Dept: CARE COORDINATION | Facility: CLINIC | Age: 73
End: 2023-06-01
Payer: COMMERCIAL

## 2023-06-01 NOTE — PROGRESS NOTES
Clinic Care Coordination Contact  [facility]/Voicemail       Clinical Data: Care Coordinator Outreach  Outreach attempted x 1.  Left message on son Jair's voicemail with call back information and requested return call.  Plan: Care Coordinator sent care coordination introduction letter on 2/22/2023 via mail. Care Coordinator will try to reach patient again in 10 business days.    Kat Roche RN Care Coordination   [facility] Claude Jennings  Email: [email or web address]  Phone: [phone]

## 2023-06-01 NOTE — LETTER
[facility]  Patient Centered Plan of Care  About Me:        Patient Name:  Rylie Young    YOB: 1950  Age:         73 year old   [place] MRN:    [id number] Telephone Information:  Home Phone [phone]   Mobile [phone]       Address:  [address]  [place] [address] Email address:  No e-mail address on record      Emergency Contact(s)    Name Relationship Lgl Grd Work Phone Home Phone Mobile Phone   1. JOEY PETER Sister   [phone]    2. TRAVIS BARRETT Son   [phone]            Primary language:  English     needed? No   [facility] [employer]:  [phone] op. 1  Other communication barriers:Glasses    Preferred Method of Communication:  Phone  Current living arrangement: I live alone (in a condo)    Mobility Status/ Medical Equipment: Independent        Health Maintenance  Health Maintenance Reviewed: Due/Overdue   Health Maintenance Due   Topic Date Due    DEXA  Never done    HEPATITIS C SCREENING  Never done    ZOSTER IMMUNIZATION (1 of 2) Never done    DIABETIC FOOT EXAM  02/06/2014    DTAP/TDAP/TD IMMUNIZATION (3 - Td or Tdap) 02/17/2022    COVID-19 Vaccine (4 - Moderna series) 03/23/2022    MEDICARE ANNUAL WELLNESS VISIT  05/02/2023           My Access Plan  Medical Emergency 911   Primary Clinic Line [facility]* - [phone]   24 Hour Appointment Line [phone] or  [phone]) (toll-free)   24 Hour Nurse Line [phone] (toll-free)   Preferred Urgent Care No data recorded   Preferred [facility]  [phone]       Preferred Pharmacy [facility] [place]     Behavioral Health Crisis Line The National Suicide Prevention Lifeline at 1-666.772.8744 or Text/Call 148             My Care Team Members  Patient Care Team         Relationship Specialty Notifications Start End    Rene Sharma MD PCP - General Internal Medicine  5/21/12      Phone: [phone] Fax: [phone] [address] [facility] [address]    Rene Sharma MD Assigned PCP   10/4/12     Phone: [phone] Fax: [phone] [address] [facility] [address]    Kat Roche, RN Lead Care Coordinator  Admissions 2/22/23               My Care Plans  Self Management and Treatment Plan  Care Plan  Care Plan: Medications       Problem: Medication Compliance       Goal: I will take the right medication at the right time, the right dose, and the right route daily.       Start Date: 2/22/2023 Expected End Date: 8/21/2023    This Visit's Progress: 10% Recent Progress: 0%    Note:     Barriers: memory issues  Strengths: motivated and engaged  Patient expressed understanding of goal: yes  Action steps to achieve this goal:  1. I will have my medication list available when taking medications  2. I will call with any questions or if I miss any medications  3. I will follow up with my doctor regularly   4. I will call my RN CC with any questions or concerns                               Action Plans on File:                       Advance Care Plans/Directives Type:   No data recorded    My Medical and Care Information  Problem List   Patient Active Problem List   Diagnosis    Edema    HYPERLIPIDEMIA LDL GOAL <100    Hypertension goal BP (blood pressure) < 140/90    Advanced directives, counseling/discussion    Type 2 diabetes mellitus with other specified complication, without long-term current use of insulin (H)      Current Medications and Allergies:    Allergies   Allergen Reactions    Jardiance [Empagliflozin] Other (See Comments)     Yeast infections     Current Outpatient Medications   Medication    aspirin 81 MG tablet    aspirin-acetaminophen-caffeine (EXCEDRIN MIGRAINE) 250-250-65 MG tablet    atenolol (TENORMIN) 50 MG tablet    blood glucose (ACCU-CHEK CHUCHO PLUS) test strip    blood glucose (NO BRAND SPECIFIED) test strip    blood glucose monitoring  (ACCU-CHEK CHUCHO PLUS) meter device kit    blood glucose monitoring (SOFTCLIX) lancets    clobetasol (TEMOVATE) 0.05 % external ointment    dulaglutide (TRULICITY) 0.75 MG/0.5ML pen    glipiZIDE (GLUCOTROL XL) 10 MG 24 hr tablet    hydrochlorothiazide (HYDRODIURIL) 25 MG tablet    ibuprofen (ADVIL/MOTRIN) 200 MG capsule    meclizine (ANTIVERT) 25 MG tablet    metFORMIN (GLUCOPHAGE) 500 MG tablet    MULTIVITAMINS OR TABS    potassium chloride ER (KLOR-CON M) 10 MEQ CR tablet    simvastatin (ZOCOR) 40 MG tablet    TRULICITY 0.75 MG/0.5ML pen     No current facility-administered medications for this visit.         Care Coordination Start Date: 2/22/2023   Frequency of Care Coordination: monthly       Form Last Updated: 06/06/2023

## 2023-06-06 NOTE — PROGRESS NOTES
Clinic Care Coordination Contact  Care Team Conversations    Updated complex care plan sent to patient via mail.     Kat Roche RN Care Coordination   [facility]  Email: [email or web address]  Phone: [phone]

## 2023-06-09 ENCOUNTER — TELEPHONE (OUTPATIENT)
Dept: INTERNAL MEDICINE | Facility: CLINIC | Age: 73
End: 2023-06-09
Payer: COMMERCIAL

## 2023-06-09 DIAGNOSIS — E11.69 TYPE 2 DIABETES MELLITUS WITH OTHER SPECIFIED COMPLICATION, WITHOUT LONG-TERM CURRENT USE OF INSULIN (H): Primary | ICD-10-CM

## 2023-06-09 NOTE — TELEPHONE ENCOUNTER
Reason for Call:  Other prescription    Detailed comments: Patient had called stating a while back her glucometer broke and stopped worker - she has not checked her blood sugar for a while now and didn't know how to go about getting a new one.    Is provider able to prescribe a new one? She would also need needles and test strips to go with it.    She would like it sent to [id number] - [place], [place] - [address]    Phone Number Patient can be reached at: Cell number on file:    Telephone Information:   Mobile [phone]       Best Time: anytime    Can we leave a detailed message on this number? YES    Call taken on 6/9/2023 at 3:53 PM by Bharti Garland

## 2023-06-15 ENCOUNTER — PATIENT OUTREACH (OUTPATIENT)
Dept: CARE COORDINATION | Facility: CLINIC | Age: 73
End: 2023-06-15
Payer: COMMERCIAL

## 2023-06-15 NOTE — PROGRESS NOTES
Clinic Care Coordination Contact  [facility]/Voicemail       Clinical Data: Care Coordinator Outreach  Outreach attempted x 2.  Left message on son Jair's voicemail with call back information and requested return call.  Plan: Care Coordinator will send unable to contact letter with care coordinator contact information via mail. Care Coordinator will try to reach patient again in 10 business days.    Kat Roche RN Care Coordination   [facility] [place] Valadez  Email: [email or web address]  Phone: [phone]

## 2023-06-15 NOTE — LETTER
[facility]ARE COORDIN[facility]  [address] [facility] [address]  Phone: [phone]      June 16, 2023      Rylie Young  [address]   [place] [address]    Dear Rylie,    We have been trying to reach you.  The goal of care coordination is to help you manage your health and improve access to the [facility] system in the most efficient manner.  The Care Coordinator is a nurse who understands the healthcare system and will assist you in improving your access to care.     As your Physician and Care Coordinator we partner to help you achieve your health care goals.     We will continue to reach out; however, if you are able to call your Care Coordinator at [phone], that would be appreciated.  We at [facility] are focused on providing you with the highest-quality healthcare experience possible.      It is a pleasure to partner with you as we work towards achieving your optimal state of wellness.        Sincerely,    AYSHA Kim Paul D None   [address] [address]

## 2023-06-29 NOTE — PROGRESS NOTES
Clinic Care Coordination Contact  [facility]/Voicemail       Clinical Data: Care Coordinator Outreach  Outreach attempted x 3.  Left message on son Jair's voicemail with call back information and requested return call.  Plan: Care Coordinator sent care coordination introduction letter on 2/22/2023 via mail. Care Coordinator will chart review in 1 month. If no return call RN CC will send disenrollment letter.     Kat Roche RN Care Coordination   [facility] Valade[facility]  Email: [email or web address]  Phone: [phone]

## 2023-07-19 DIAGNOSIS — I10 HYPERTENSION GOAL BP (BLOOD PRESSURE) < 140/90: ICD-10-CM

## 2023-07-19 RX ORDER — POTASSIUM CHLORIDE 750 MG/1
TABLET, EXTENDED RELEASE ORAL
Qty: 180 TABLET | Refills: 1 | Status: SHIPPED | OUTPATIENT
Start: 2023-07-19

## 2023-07-19 RX ORDER — ATENOLOL 50 MG/1
TABLET ORAL
Qty: 90 TABLET | Refills: 1 | Status: SHIPPED | OUTPATIENT
Start: 2023-07-19

## 2023-08-07 DIAGNOSIS — E11.69 TYPE 2 DIABETES MELLITUS WITH OTHER SPECIFIED COMPLICATION, WITHOUT LONG-TERM CURRENT USE OF INSULIN (H): Primary | ICD-10-CM

## 2023-09-01 ENCOUNTER — PATIENT OUTREACH (OUTPATIENT)
Dept: CARE COORDINATION | Facility: CLINIC | Age: 73
End: 2023-09-01
Payer: MEDICARE

## 2023-09-01 NOTE — PROGRESS NOTES
Clinic Care Coordination Contact  [facility]       Clinical Data: Care Coordinator Outreach  Outreach attempted x 3.    Plan: Care Coordinator will send disenrollment letter with care coordinator contact information via mail. Care Coordinator will do no further outreaches at this time.    Kat Roche RN Care Coordination   [facility]  Email: [email or web address]  Phone: [phone]

## 2023-09-01 NOTE — LETTER
[facility]ORD[facility]  [address] [facility] [address] [address]    September 5, 2023    Rylie Young  [address]   [facility] [address]      Dear Rylie,    I have been unsuccessful in reaching you since our last contact. At this time the Care Coordination team will make no further attempts to reach you, however this does not change your ability to continue receiving care from your providers at your primary care clinic. If you need additional support from a care coordinator in the future please contact Kat WOMACK RN CC, at [phone].    All of us at [facility] are invested in your health and are here to assist you in meeting your goals.     Sincerely,    Kat Roche RN Care Coordination   [facility]  [place], [place], [place]  Phone: [phone]

## 2023-10-12 DIAGNOSIS — E11.9 TYPE 2 DIABETES MELLITUS WITHOUT COMPLICATION, WITHOUT LONG-TERM CURRENT USE OF INSULIN (H): ICD-10-CM

## 2023-10-12 RX ORDER — DULAGLUTIDE 0.75 MG/.5ML
0.75 INJECTION, SOLUTION SUBCUTANEOUS
Qty: 2 ML | Refills: 0 | Status: SHIPPED | OUTPATIENT
Start: 2023-10-12 | End: 2023-11-06

## 2023-11-06 DIAGNOSIS — E11.9 TYPE 2 DIABETES MELLITUS WITHOUT COMPLICATION, WITHOUT LONG-TERM CURRENT USE OF INSULIN (H): ICD-10-CM

## 2023-11-06 RX ORDER — DULAGLUTIDE 0.75 MG/.5ML
INJECTION, SOLUTION SUBCUTANEOUS
Qty: 2 ML | Refills: 1 | Status: SHIPPED | OUTPATIENT
Start: 2023-11-06

## 2023-11-08 ENCOUNTER — TRANSFERRED RECORDS (OUTPATIENT)
Dept: HEALTH INFORMATION MANAGEMENT | Facility: CLINIC | Age: 73
End: 2023-11-08
Payer: MEDICARE

## 2023-11-08 LAB — RETINOPATHY: POSITIVE

## 2024-04-08 ENCOUNTER — LAB REQUISITION (OUTPATIENT)
Dept: LAB | Facility: CLINIC | Age: 74
End: 2024-04-08
Payer: MEDICARE

## 2024-04-08 DIAGNOSIS — E11.9 TYPE 2 DIABETES MELLITUS WITHOUT COMPLICATIONS (H): ICD-10-CM

## 2024-04-09 LAB — HBA1C MFR BLD: 11.5 %

## 2024-04-09 PROCEDURE — 36415 COLL VENOUS BLD VENIPUNCTURE: CPT | Mod: ORL | Performed by: NURSE PRACTITIONER

## 2024-04-09 PROCEDURE — P9604 ONE-WAY ALLOW PRORATED TRIP: HCPCS | Mod: ORL | Performed by: NURSE PRACTITIONER

## 2024-04-09 PROCEDURE — 83036 HEMOGLOBIN GLYCOSYLATED A1C: CPT | Mod: ORL | Performed by: NURSE PRACTITIONER

## 2024-08-22 ENCOUNTER — LAB REQUISITION (OUTPATIENT)
Dept: LAB | Facility: CLINIC | Age: 74
End: 2024-08-22
Payer: MEDICARE

## 2024-08-22 DIAGNOSIS — E11.9 TYPE 2 DIABETES MELLITUS WITHOUT COMPLICATIONS (H): ICD-10-CM

## 2024-08-27 LAB
ANION GAP SERPL CALCULATED.3IONS-SCNC: 11 MMOL/L (ref 7–15)
BUN SERPL-MCNC: 14.7 MG/DL (ref 8–23)
CALCIUM SERPL-MCNC: 9.4 MG/DL (ref 8.8–10.4)
CHLORIDE SERPL-SCNC: 102 MMOL/L (ref 98–107)
CREAT SERPL-MCNC: 0.84 MG/DL (ref 0.51–0.95)
EGFRCR SERPLBLD CKD-EPI 2021: 72 ML/MIN/1.73M2
ERYTHROCYTE [DISTWIDTH] IN BLOOD BY AUTOMATED COUNT: 12.6 % (ref 10–15)
GLUCOSE SERPL-MCNC: 102 MG/DL (ref 70–99)
HBA1C MFR BLD: 6.7 %
HCO3 SERPL-SCNC: 28 MMOL/L (ref 22–29)
HCT VFR BLD AUTO: 36.7 % (ref 35–47)
HGB BLD-MCNC: 12 G/DL (ref 11.7–15.7)
MCH RBC QN AUTO: 32 PG (ref 26.5–33)
MCHC RBC AUTO-ENTMCNC: 32.7 G/DL (ref 31.5–36.5)
MCV RBC AUTO: 98 FL (ref 78–100)
PLATELET # BLD AUTO: 212 10E3/UL (ref 150–450)
POTASSIUM SERPL-SCNC: 3.7 MMOL/L (ref 3.4–5.3)
RBC # BLD AUTO: 3.75 10E6/UL (ref 3.8–5.2)
SODIUM SERPL-SCNC: 141 MMOL/L (ref 135–145)
WBC # BLD AUTO: 5.6 10E3/UL (ref 4–11)

## 2024-08-27 PROCEDURE — P9604 ONE-WAY ALLOW PRORATED TRIP: HCPCS | Mod: ORL | Performed by: NURSE PRACTITIONER

## 2024-08-27 PROCEDURE — 80048 BASIC METABOLIC PNL TOTAL CA: CPT | Mod: ORL | Performed by: NURSE PRACTITIONER

## 2024-08-27 PROCEDURE — 36415 COLL VENOUS BLD VENIPUNCTURE: CPT | Mod: ORL | Performed by: NURSE PRACTITIONER

## 2024-08-27 PROCEDURE — 85027 COMPLETE CBC AUTOMATED: CPT | Mod: ORL | Performed by: NURSE PRACTITIONER

## 2024-08-27 PROCEDURE — 83036 HEMOGLOBIN GLYCOSYLATED A1C: CPT | Mod: ORL | Performed by: NURSE PRACTITIONER

## 2025-04-07 NOTE — TELEPHONE ENCOUNTER
Pt informed of Dr. Sharma's response/orders.  
Really nothing else that works,   She can try an over the counter cough syrup that doesn't have codeine  
Reason for Call:  Other prescription    Detailed comments: Patient was seen for a cough and prescribed guaiFENesin-codeine (ROBITUSSIN AC) 100-10 MG/5ML solution. She is concerned about the codeine. Wondering if there is something else she could take that doesn't have the codeine? Would like a call back from PCP to discuss this.     Phone Number Patient can be reached at: Home number on file [phone] (home)    Best Time: any    Can we leave a detailed message on this number? YES    Call taken on 1/21/2020 at 4:22 PM by Crystal Beebe CNA      
No